# Patient Record
Sex: FEMALE | Race: WHITE | Employment: OTHER | URBAN - METROPOLITAN AREA
[De-identification: names, ages, dates, MRNs, and addresses within clinical notes are randomized per-mention and may not be internally consistent; named-entity substitution may affect disease eponyms.]

---

## 2017-01-23 ENCOUNTER — OFFICE VISIT (OUTPATIENT)
Dept: ORTHOPEDIC SURGERY | Facility: CLINIC | Age: 57
End: 2017-01-23

## 2017-01-23 VITALS
DIASTOLIC BLOOD PRESSURE: 77 MMHG | HEART RATE: 69 BPM | BODY MASS INDEX: 22.14 KG/M2 | HEIGHT: 66 IN | WEIGHT: 137.8 LBS | SYSTOLIC BLOOD PRESSURE: 112 MMHG | TEMPERATURE: 98.4 F

## 2017-01-23 DIAGNOSIS — S81.851A DOG BITE OF LOWER LEG, RIGHT, INITIAL ENCOUNTER: Primary | ICD-10-CM
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
## 2017-01-23 RX ORDER — LETROZOLE 2.5 MG/1
2.5 TABLET, FILM COATED ORAL DAILY
COMMUNITY
End: 2020-12-14

## 2017-01-23 RX ORDER — ACETAMINOPHEN 500 MG
TABLET ORAL
COMMUNITY

## 2017-01-23 RX ORDER — CHOLECALCIFEROL (VITAMIN D3) 125 MCG
CAPSULE ORAL
COMMUNITY
End: 2020-12-14

## 2017-01-23 NOTE — PATIENT INSTRUCTIONS
Animal Bites: Care Instructions  Your Care Instructions  After an animal bite, the biggest concern is infection. The chance of infection depends on the type of animal that bit you, where on your body you were bitten, and your general health. Many animal bites are not closed with stitches, because this can increase the chance of infection. Your bite may take as little as 7 days or as long as several months to heal, depending on how bad it is. Taking good care of your wound at home will help it heal and reduce your chance of infection. The doctor has checked you carefully, but problems can develop later. If you notice any problems or new symptoms, get medical treatment right away. Follow-up care is a key part of your treatment and safety. Be sure to make and go to all appointments, and call your doctor if you are having problems. It's also a good idea to know your test results and keep a list of the medicines you take. How can you care for yourself at home? · If your doctor told you how to care for your wound, follow your doctor's instructions. If you did not get instructions, follow this general advice:  ¨ After 24 to 48 hours, gently wash the wound with clean water 2 times a day. Do not scrub or soak the wound. Don't use hydrogen peroxide or alcohol, which can slow healing. ¨ You may cover the wound with a thin layer of petroleum jelly, such as Vaseline, and a nonstick bandage. ¨ Apply more petroleum jelly and replace the bandage as needed. · After you shower, gently dry the wound with a clean towel. · If your doctor has closed the wound, cover the bandage with a plastic bag before you take a shower. · A small amount of skin redness and swelling around the wound edges and the stitches or staples is normal. Your wound may itch or feel irritated. Do not scratch or rub the wound.   · Ask your doctor if you can take an over-the-counter pain medicine, such as acetaminophen (Tylenol), ibuprofen (Advil, Motrin), or naproxen (Aleve). Read and follow all instructions on the label. · Do not take two or more pain medicines at the same time unless the doctor told you to. Many pain medicines have acetaminophen, which is Tylenol. Too much acetaminophen (Tylenol) can be harmful. · If your bite puts you at risk for rabies, you will get a series of shots over the next few weeks to prevent rabies. Your doctor will tell you when to get the shots. It is very important that you get the full cycle of shots. Follow your doctor's instructions exactly. · You may need a tetanus shot if you have not received one in the last 5 years. · If your doctor prescribed antibiotics, take them as directed. Do not stop taking them just because you feel better. You need to take the full course of antibiotics. When should you call for help? Call your doctor now or seek immediate medical care if:  · The skin near the bite turns cold or pale or it changes color. · You lose feeling in the area near the bite, or it feels numb or tingly. · You have trouble moving a limb near the bite. · You have symptoms of infection, such as:  ¨ Increased pain, swelling, warmth, or redness near the wound. ¨ Red streaks leading from the wound. ¨ Pus draining from the wound. ¨ A fever. · Blood soaks through the bandage. Oozing small amounts of blood is normal.  · Your pain is getting worse. Watch closely for changes in your health, and be sure to contact your doctor if you are not getting better as expected. Where can you learn more? Go to http://lilly-artem.info/. Enter K094 in the search box to learn more about \"Animal Bites: Care Instructions. \"  Current as of: May 27, 2016  Content Version: 11.1  © 4409-9053 [employer]. Care instructions adapted under license by [employer] (which disclaims liability or warranty for this information).  If you have questions about a medical condition or this instruction, always ask your healthcare professional. [place] [address] any warranty or liability for your use of this information.

## 2017-01-23 NOTE — PROGRESS NOTES
Patient: Cheryl Kamara                MRN: [id number]       SSN: [id number]  YOB: 1960        AGE: 49 y.o. SEX: female    PCP: Mp Saleh MD  01/23/17    Chief Complaint   Patient presents with    Leg Pain     Right     HISTORY:  Cheryl Kamara is a 49 y.o. female who is seen for right leg pain. She reports that she was bitten by her friend's dog who is a long-haired Daschund on her right lower leg on 1/20/17 and landed on her right hip. She says that the dog was out in her friend's backyard when the incident occurred. The dog started nipping at her leg but eventually bit her calf and latched on. The dog's mouth had to be pried off her leg. She was prescribed antibiotics and given a tetanus shot at the ED. She states that she passed out from her right leg pain. She notes that she recommended that her friend put the dog down to ensure that no one else gets bitten in the future. She has some numbness and tingling in her right foot and toes currently. 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 She is  and does all of the [employer] store  work  Current weight is 137 pounds. She reports a h/o breast cancer, lymphedema and heart ablation. She was treated for her breast cancer with chemotherapy and lumpectomy. 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 She has two children. Her daughter lives in [place]. Her younger daughter is current in school at [employer]. She reports a h/o left arm lymphedema and neuropathy following her lumpectomy. She says that she exercises regularly at the gym. Weight Metrics 1/23/2017 4/22/2016 2/2/2015   Weight 137 lb 12.8 oz 136 lb 8 oz 130 lb   BMI 22.24 kg/m2 22.71 kg/m2 21.63 kg/m2     There is no problem list on file for this patient.     REVIEW OF SYSTEMS: All Below are Negative except: See HPI   Constitutional: negative for fever, chills, and weight loss. Cardiovascular: negative for chest pain, claudication, leg swelling, SOB, REINA   Gastrointestinal: Negative for pain, N/V/C/D, Blood in stool or urine, dysuria,  hematuria, incontinence, pelvic pain. Musculoskeletal: See HPI   Neurological: Negative for dizziness and weakness. Negative for headaches, Visual changes, confusion, seizures   Phychiatric/Behavioral: Negative for depression, memory loss, substance  abuse. Extremities: Negative for hair changes, rash, or skin lesion changes. Hematologic: Negative for bleeding problems, bruising, pallor or swollen lymph  nodes   Peripheral Vascular: No calf pain, no circulation deficits. Social History     Social History    Marital status:      Spouse name: N/A    Number of children: N/A    Years of education: N/A     Occupational History    Not on file. Social History Main Topics    Smoking status: Never Smoker    Smokeless tobacco: Never Used    Alcohol use Yes      Comment: daily wine    Drug use: No    Sexual activity: Not on file     Other Topics Concern    Not on file     Social History Narrative      Allergies   Allergen Reactions    Pcn [Penicillins] Anaphylaxis and Hives    Shellfish Derived Anaphylaxis and Hives      Current Outpatient Prescriptions   Medication Sig    letrozole (FEMARA) 2.5 mg tablet Take 2.5 mg by mouth daily.  naproxen sodium (ALEVE) 220 mg cap Take  by mouth.  acetaminophen (TYLENOL EXTRA STRENGTH) 500 mg tablet Take  by mouth every six (6) hours as needed for Pain.  nebivolol (BYSTOLIC) 5 mg tablet Take 5 mg by mouth daily.  ALPRAZolam (XANAX) 0.25 mg tablet Take 0.25 mg by mouth nightly as needed for Anxiety. Indications: ANXIETY    ondansetron (ZOFRAN ODT) 4 mg disintegrating tablet Take 4 mg by mouth every eight (8) hours as needed for Nausea.  anastrozole (ARIMIDEX) 1 mg tablet Take 1 mg by mouth daily.      No current facility-administered medications for this visit. PHYSICAL EXAMINATION:  Visit Vitals    /77    Pulse 69    Temp 98.4 °F (36.9 °C) (Oral)    Ht 5' 6\" (1.676 m)    Wt 137 lb 12.8 oz (62.5 kg)    BMI 22.24 kg/m2      ORTHO EXAMINATION:  Examination Right knee Left knee   Skin Intact Intact   Range of motion 120-0 120-0   Effusion - -   Medial joint line tenderness + +   Lateral joint line tenderness - -   Popliteal tenderness - -   Osteophytes palpable - -   Sylvesters - -   Patella crepitus - -   Anterior drawer - -   Lateral laxity - -   Medial laxity - -   Varus deformity - -   Valgus deformity - -   Pretibial edema - -   Calf tenderness - -     Examination Right Ankle/Foot   Skin Transverse laceration over distal medial lower right leg, nearly 100 puncture wounds (some deep)  Minimal serous drainage   Swelling -   Dorsiflexion 10   Plantarflexion 25   Deformity -   Inversion laxity -   Anterior drawer -   Medial tenderness -   Lateral tenderness -   Heel cord Intact   Sensation Intact   Bunion -   Toe nails Normal   Capillary refill Normal     IMPRESSION:      ICD-10-CM ICD-9-CM    1. Dog bite of lower leg, right, initial encounter S81.851A 891.0     W54. 0XXA E906.0     Calf     PLAN:  Her right calf dog-bite wound was cleaned and dressed today. She will clean her wound with alcohol apply antiseptic ointment as recommended. She will follow up in 9 days for possible suture removal.  There is no need for surgery at this time. She will complete her course of antibiotics. We discussed a possible course of right leg PT in the future.       Scribed by [employer] ([address]) as dictated by Dangelo Marino MD

## 2017-02-01 ENCOUNTER — OFFICE VISIT (OUTPATIENT)
Dept: ORTHOPEDIC SURGERY | Age: 57
End: 2017-02-01

## 2017-02-01 VITALS
TEMPERATURE: 97.6 F | SYSTOLIC BLOOD PRESSURE: 125 MMHG | WEIGHT: 137.2 LBS | BODY MASS INDEX: 22.05 KG/M2 | HEIGHT: 66 IN | HEART RATE: 62 BPM | DIASTOLIC BLOOD PRESSURE: 73 MMHG

## 2017-02-01 DIAGNOSIS — S81.851D DOG BITE OF LOWER LEG, RIGHT, SUBSEQUENT ENCOUNTER: Primary | ICD-10-CM
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
## 2017-02-01 NOTE — PATIENT INSTRUCTIONS
Animal Bites: Care Instructions  Your Care Instructions  After an animal bite, the biggest concern is infection. The chance of infection depends on the type of animal that bit you, where on your body you were bitten, and your general health. Many animal bites are not closed with stitches, because this can increase the chance of infection. Your bite may take as little as 7 days or as long as several months to heal, depending on how bad it is. Taking good care of your wound at home will help it heal and reduce your chance of infection. The doctor has checked you carefully, but problems can develop later. If you notice any problems or new symptoms, get medical treatment right away. Follow-up care is a key part of your treatment and safety. Be sure to make and go to all appointments, and call your doctor if you are having problems. It's also a good idea to know your test results and keep a list of the medicines you take. How can you care for yourself at home? · If your doctor told you how to care for your wound, follow your doctor's instructions. If you did not get instructions, follow this general advice:  ¨ After 24 to 48 hours, gently wash the wound with clean water 2 times a day. Do not scrub or soak the wound. Don't use hydrogen peroxide or alcohol, which can slow healing. ¨ You may cover the wound with a thin layer of petroleum jelly, such as Vaseline, and a nonstick bandage. ¨ Apply more petroleum jelly and replace the bandage as needed. · After you shower, gently dry the wound with a clean towel. · If your doctor has closed the wound, cover the bandage with a plastic bag before you take a shower. · A small amount of skin redness and swelling around the wound edges and the stitches or staples is normal. Your wound may itch or feel irritated. Do not scratch or rub the wound.   · Ask your doctor if you can take an over-the-counter pain medicine, such as acetaminophen (Tylenol), ibuprofen (Advil, Motrin), or naproxen (Aleve). Read and follow all instructions on the label. · Do not take two or more pain medicines at the same time unless the doctor told you to. Many pain medicines have acetaminophen, which is Tylenol. Too much acetaminophen (Tylenol) can be harmful. · If your bite puts you at risk for rabies, you will get a series of shots over the next few weeks to prevent rabies. Your doctor will tell you when to get the shots. It is very important that you get the full cycle of shots. Follow your doctor's instructions exactly. · You may need a tetanus shot if you have not received one in the last 5 years. · If your doctor prescribed antibiotics, take them as directed. Do not stop taking them just because you feel better. You need to take the full course of antibiotics. When should you call for help? Call your doctor now or seek immediate medical care if:  · The skin near the bite turns cold or pale or it changes color. · You lose feeling in the area near the bite, or it feels numb or tingly. · You have trouble moving a limb near the bite. · You have symptoms of infection, such as:  ¨ Increased pain, swelling, warmth, or redness near the wound. ¨ Red streaks leading from the wound. ¨ Pus draining from the wound. ¨ A fever. · Blood soaks through the bandage. Oozing small amounts of blood is normal.  · Your pain is getting worse. Watch closely for changes in your health, and be sure to contact your doctor if you are not getting better as expected. Where can you learn more? Go to http://lilly-artem.info/. Enter F067 in the search box to learn more about \"Animal Bites: Care Instructions. \"  Current as of: May 27, 2016  Content Version: 11.1  © 7865-4279 [employer]. Care instructions adapted under license by [employer] (which disclaims liability or warranty for this information).  If you have questions about a medical condition or this instruction, always ask your healthcare professional. [place] [address] any warranty or liability for your use of this information.

## 2017-02-01 NOTE — PROGRESS NOTES
Patient: Josr Greene                MRN: [id number]       SSN: [id number]  YOB: 1960        AGE: 49 y.o. SEX: female    PCP: Lazaro Gorman MD  02/01/17    Chief Complaint   Patient presents with    Leg Pain     Right     HISTORY:  Josr Greene is a 49 y.o. female who is seen for right leg pain. She reports that she was bitten by her friend's dog who is a long-haired Daschund on her right lower leg on 1/20/17 and landed on her right hip. She says that the dog was out in her friend's backyard when the incident occurred. The dog started nipping at her leg but eventually bit her calf and latched on. The dog's mouth had to be pried off her leg. She reports that the dog was put down after the incident and that she tested negative for rabies. She was prescribed antibiotics and given a tetanus shot at the ED. She states that she passed out from her right leg pain. She notes that she recommended that her friend put the dog down to ensure that no one else gets bitten in the future. She has some numbness and tingling in her right foot and toes currently. She is experiencing right calf soreness today but overall sheis doing much better. She has taken Aleve and tylenol for pain with benefit. 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 She is  and does all of the [employer] store  work  Current weight is 137 pounds. She reports a h/o breast cancer, lymphedema and heart ablation. She was treated for her breast cancer with chemotherapy and lumpectomy. 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 She has two children. Her daughter lives in [place]. Her younger daughter is current in school at [employer]. She reports a h/o left arm lymphedema and neuropathy following her lumpectomy. She says that she exercises regularly at the gym.       Weight Metrics 2/1/2017 1/23/2017 4/22/2016 2/2/2015   Weight 137 lb 3.2 oz 137 lb 12.8 oz 136 lb 8 oz 130 lb   BMI 22.14 kg/m2 22.24 kg/m2 22.71 kg/m2 21.63 kg/m2     There is no problem list on file for this patient. REVIEW OF SYSTEMS: All Below are Negative except: See HPI   Constitutional: negative for fever, chills, and weight loss. Cardiovascular: negative for chest pain, claudication, leg swelling, SOB, REINA   Gastrointestinal: Negative for pain, N/V/C/D, Blood in stool or urine, dysuria,  hematuria, incontinence, pelvic pain. Musculoskeletal: See HPI   Neurological: Negative for dizziness and weakness. Negative for headaches, Visual changes, confusion, seizures   Phychiatric/Behavioral: Negative for depression, memory loss, substance  abuse. Extremities: Negative for hair changes, rash, or skin lesion changes. Hematologic: Negative for bleeding problems, bruising, pallor or swollen lymph  nodes   Peripheral Vascular: No calf pain, no circulation deficits. Social History     Social History    Marital status:      Spouse name: N/A    Number of children: N/A    Years of education: N/A     Occupational History    Not on file. Social History Main Topics    Smoking status: Never Smoker    Smokeless tobacco: Never Used    Alcohol use Yes      Comment: daily wine    Drug use: No    Sexual activity: Not on file     Other Topics Concern    Not on file     Social History Narrative      Allergies   Allergen Reactions    Pcn [Penicillins] Anaphylaxis and Hives    Shellfish Derived Anaphylaxis and Hives      Current Outpatient Prescriptions   Medication Sig    letrozole (FEMARA) 2.5 mg tablet Take 2.5 mg by mouth daily.  acetaminophen (TYLENOL EXTRA STRENGTH) 500 mg tablet Take  by mouth every six (6) hours as needed for Pain.  ondansetron (ZOFRAN ODT) 4 mg disintegrating tablet Take 4 mg by mouth every eight (8) hours as needed for Nausea.     nebivolol (BYSTOLIC) 5 mg tablet Take 5 mg by mouth daily.  ALPRAZolam (XANAX) 0.25 mg tablet Take 0.25 mg by mouth nightly as needed for Anxiety. Indications: ANXIETY    naproxen sodium (ALEVE) 220 mg cap Take  by mouth.  anastrozole (ARIMIDEX) 1 mg tablet Take 1 mg by mouth daily. No current facility-administered medications for this visit. PHYSICAL EXAMINATION:  Visit Vitals    /73    Pulse 62    Temp 97.6 °F (36.4 °C) (Oral)    Ht 5' 6\" (1.676 m)    Wt 137 lb 3.2 oz (62.2 kg)    BMI 22.14 kg/m2      ORTHO EXAMINATION:  Examination Right Ankle/Foot   Skin Transverse laceration over distal medial lower right leg, nearly 100 puncture wounds (some deep)  Minimal serous drainage   Swelling -   Dorsiflexion 10   Plantarflexion 25   Deformity -   Inversion laxity -   Anterior drawer -   Medial tenderness -   Lateral tenderness -   Heel cord Intact   Sensation Intact   Bunion -   Toe nails Normal   Capillary refill Normal     IMPRESSION:      ICD-10-CM ICD-9-CM    1. Dog bite of lower leg, right, subsequent encounter S81.851D V58.89     W54. 0XXD 891.0      PLAN:  Her right calf dog-bite wound was cleaned, dressed, and her sutures were removed today. She will clean her wound with alcohol apply antiseptic ointment as recommended. She will follow up as needed. There is no need for surgery at this time. We discussed possible right calf I&D in the future. We discussed a possible course of right leg PT in the future depending on her progress.       Scribed by [employer] ([facility]) as dictated by Macrina Funes MD

## 2017-04-10 ENCOUNTER — HOSPITAL ENCOUNTER (OUTPATIENT)
Dept: GENERAL RADIOLOGY | Age: 57
Discharge: HOME | End: 2017-04-10
Attending: INTERNAL MEDICINE
Payer: COMMERCIAL

## 2017-04-10 ENCOUNTER — HOSPITAL ENCOUNTER (OUTPATIENT)
Dept: NUCLEAR MEDICINE | Age: 57
Discharge: HOME | End: 2017-04-10
Attending: INTERNAL MEDICINE
Payer: COMMERCIAL

## 2017-04-10 DIAGNOSIS — Z85.3 HISTORY OF BREAST CANCER: ICD-10-CM

## 2017-04-10 DIAGNOSIS — M16.11 ARTHROPATHY OF RIGHT HIP: ICD-10-CM

## 2017-04-10 PROCEDURE — 78306 BONE IMAGING WHOLE BODY: CPT

## 2017-04-10 PROCEDURE — 73502 X-RAY EXAM HIP UNI 2-3 VIEWS: CPT

## 2018-12-28 ENCOUNTER — HOSPITAL ENCOUNTER (OUTPATIENT)
Dept: BONE DENSITY | Age: 58
Discharge: HOME | End: 2018-12-28
Payer: COMMERCIAL

## 2018-12-28 DIAGNOSIS — Z78.0 POST-MENOPAUSAL: ICD-10-CM

## 2018-12-28 DIAGNOSIS — R23.2 FLUSHING: ICD-10-CM

## 2018-12-28 DIAGNOSIS — D64.9 ANEMIA, UNSPECIFIED: ICD-10-CM

## 2018-12-28 DIAGNOSIS — C50.912 MALIGNANT NEOPLASM OF LEFT BREAST (HCC): ICD-10-CM

## 2018-12-28 DIAGNOSIS — N95.1 FEMALE CLIMACTERIC STATE: ICD-10-CM

## 2018-12-28 PROCEDURE — 77080 DXA BONE DENSITY AXIAL: CPT

## 2020-08-07 DIAGNOSIS — F41.9 ANXIETY: Primary | ICD-10-CM

## 2020-08-07 RX ORDER — ALPRAZOLAM 0.25 MG/1
TABLET ORAL
Qty: 60 TAB | Refills: 3 | Status: SHIPPED | OUTPATIENT
Start: 2020-08-07 | End: 2020-12-14 | Stop reason: SDUPTHER

## 2020-08-07 NOTE — TELEPHONE ENCOUNTER
reviewed. I saw her in July. Med called in with refills. She will need to see me in three months to re-assess and consider dose de-ecalation.  was reviewed and Rx is appropriate

## 2020-10-29 ENCOUNTER — TRANSCRIBE ORDER (OUTPATIENT)
Dept: SCHEDULING | Age: 60
End: 2020-10-29

## 2020-10-29 DIAGNOSIS — N95.1 SYMPTOMATIC MENOPAUSAL OR FEMALE CLIMACTERIC STATES: Primary | ICD-10-CM

## 2020-12-14 ENCOUNTER — VIRTUAL VISIT (OUTPATIENT)
Dept: INTERNAL MEDICINE CLINIC | Age: 60
End: 2020-12-14
Payer: COMMERCIAL

## 2020-12-14 DIAGNOSIS — F41.9 ANXIETY: ICD-10-CM

## 2020-12-14 DIAGNOSIS — I10 ESSENTIAL HYPERTENSION: Primary | ICD-10-CM

## 2020-12-14 PROBLEM — E55.9 VITAMIN D DEFICIENCY: Status: ACTIVE | Noted: 2020-12-14

## 2020-12-14 PROCEDURE — 99442 PR PHYS/QHP TELEPHONE EVALUATION 11-20 MIN: CPT | Performed by: INTERNAL MEDICINE

## 2020-12-14 RX ORDER — ALPRAZOLAM 0.25 MG/1
0.25 TABLET ORAL DAILY
COMMUNITY
End: 2020-12-14 | Stop reason: SDUPTHER

## 2020-12-14 RX ORDER — NEBIVOLOL 5 MG/1
5 TABLET ORAL DAILY
COMMUNITY
End: 2020-12-14 | Stop reason: SDUPTHER

## 2020-12-14 RX ORDER — EXEMESTANE 25 MG/1
25 TABLET ORAL DAILY
COMMUNITY
Start: 2020-10-27

## 2020-12-14 RX ORDER — ACETAMINOPHEN 500 MG
2000 TABLET ORAL DAILY
COMMUNITY

## 2020-12-14 RX ORDER — ALPRAZOLAM 0.25 MG/1
0.25 TABLET ORAL
Qty: 60 TAB | Refills: 1 | Status: SHIPPED | OUTPATIENT
Start: 2020-12-14 | End: 2021-04-19

## 2020-12-14 RX ORDER — VENLAFAXINE 37.5 MG/1
37.5 TABLET ORAL DAILY
COMMUNITY
Start: 2020-10-27 | End: 2021-12-30 | Stop reason: DRUGHIGH

## 2020-12-14 NOTE — PROGRESS NOTES
I was at my office in [place], [place] while conducting this encounter. The patient is at home. Consent:  Patient and/or HIS/HER healthcare decision maker is aware that this patient-initiated Telehealth encounter is a billable service, with coverage as determined by patient's insurance carrier. Patient is aware that He/She may receive a bill and has provided verbal consent to proceed: Consent has been obtained within past 12 months of the date of this encounter. This virtual visit was conducted via Telephone    1. Anxiety  Mrs. Carlos A Rosen only takes this at most once every couple of days. I have reviewed the  and I see no abuse potential.  Will renew Xanax  - ALPRAZolam (XANAX) 0.25 mg tablet; Take 1 Tab by mouth nightly as needed for Anxiety. Max Daily Amount: 0.25 mg. Dispense: 60 Tab; Refill: 1    2. Essential hypertension  She reports blood pressure systolic readings in the low 1 teens. Continue Bystolic blood pressure stable       Chief Complaint   Patient presents with    Medication Refill        HPI   This is a delightful 73-year-old female with a history of anxiety and hypertension as well as breast cancer who presents for medication refill. The patient reports has been taking as needed Xanax once maybe every other 2 days for quite some time. Usually when she gets really stressed out or when she just keeps obsessing before sleep is when she takes it. She is never had any deleterious side effects. She denies any falls. She reports her blood pressure has been well controlled with a systolic reading of 547. Aside from her hot flashes she feels great and has not had any recent illnesses. Current Outpatient Medications on File Prior to Visit   Medication Sig Dispense Refill    venlafaxine (EFFEXOR) 37.5 mg tablet Take 37.5 mg by mouth daily. 1 pill in morning and 2 pills at night for hot flashes      exemestane (AROMASIN) 25 mg tablet Take 25 mg by mouth daily.       cholecalciferol (VITAMIN D3) (2,000 UNITS /50 MCG) cap capsule Take 2,000 Units by mouth daily.  acetaminophen (TYLENOL EXTRA STRENGTH) 500 mg tablet Take  by mouth every six (6) hours as needed for Pain.  nebivolol (BYSTOLIC) 5 mg tablet Take 5 mg by mouth daily. No current facility-administered medications on file prior to visit.          Patient Active Problem List   Diagnosis Code    Vitamin D deficiency E55.9             Total Time Spent on this Encounter:12 minutes spent

## 2020-12-14 NOTE — PROGRESS NOTES
Promise Toussaint presents today for   Chief Complaint   Patient presents with    Medication Refill       Depression Screening:  3 most recent PHQ Screens 12/14/2020   Little interest or pleasure in doing things Not at all   Feeling down, depressed, irritable, or hopeless Not at all   Total Score PHQ 2 0       Learning Assessment:  Learning Assessment 12/14/2020   PRIMARY LEARNER Patient   HIGHEST LEVEL OF EDUCATION - PRIMARY LEARNER  2 YEARS OF COLLEGE   BARRIERS PRIMARY LEARNER NONE   PRIMARY LANGUAGE ENGLISH   LEARNER PREFERENCE PRIMARY READING   ANSWERED BY patient   RELATIONSHIP SELF       Health Maintenance reviewed and discussed and ordered per Provider. Health Maintenance Due   Topic Date Due    Hepatitis C Screening  1960    PAP AKA CERVICAL CYTOLOGY  09/09/1983    Lipid Screen  09/09/2002    Shingrix Vaccine Age 50> (1 of 2) 09/09/2012    Colorectal Cancer Screening Combo  09/09/2012    Breast Cancer Screen Mammogram  09/09/2012    Flu Vaccine (1) 09/01/2020   . Coordination of Care:  1. Have you been to the ER, urgent care clinic since your last visit? Hospitalized since your last visit? no    2. Have you seen or consulted any other health care providers outside of the [address] [place] [address] since your last visit? Include any pap smears or colon screening.  no

## 2021-01-07 RX ORDER — NEBIVOLOL HYDROCHLORIDE 5 MG/1
TABLET ORAL
Qty: 90 TAB | Refills: 1 | Status: SHIPPED | OUTPATIENT
Start: 2021-01-07 | End: 2021-07-02

## 2021-01-27 ENCOUNTER — HOSPITAL ENCOUNTER (OUTPATIENT)
Dept: BONE DENSITY | Age: 61
Discharge: HOME | End: 2021-01-27
Attending: INTERNAL MEDICINE
Payer: COMMERCIAL

## 2021-01-27 DIAGNOSIS — N95.1 SYMPTOMATIC MENOPAUSAL OR FEMALE CLIMACTERIC STATES: ICD-10-CM

## 2021-01-27 PROCEDURE — 77080 DXA BONE DENSITY AXIAL: CPT

## 2021-04-18 DIAGNOSIS — F41.9 ANXIETY: ICD-10-CM

## 2021-04-19 RX ORDER — ALPRAZOLAM 0.25 MG/1
TABLET ORAL
Qty: 30 TAB | Refills: 3 | Status: SHIPPED | OUTPATIENT
Start: 2021-04-19 | End: 2021-12-30 | Stop reason: CLARIF

## 2021-07-02 RX ORDER — NEBIVOLOL HYDROCHLORIDE 5 MG/1
TABLET ORAL
Qty: 90 TABLET | Refills: 1 | Status: SHIPPED | OUTPATIENT
Start: 2021-07-02 | End: 2021-12-22

## 2021-07-19 LAB — COLONOSCOPY, EXTERNAL: NORMAL

## 2021-12-22 RX ORDER — NEBIVOLOL HYDROCHLORIDE 5 MG/1
TABLET ORAL
Qty: 90 TABLET | Refills: 1 | Status: SHIPPED | OUTPATIENT
Start: 2021-12-22 | End: 2022-07-13

## 2021-12-30 ENCOUNTER — VIRTUAL VISIT (OUTPATIENT)
Dept: INTERNAL MEDICINE CLINIC | Age: 61
End: 2021-12-30
Payer: COMMERCIAL

## 2021-12-30 DIAGNOSIS — F41.9 ANXIETY AND DEPRESSION: Primary | ICD-10-CM

## 2021-12-30 DIAGNOSIS — I10 ESSENTIAL HYPERTENSION: ICD-10-CM

## 2021-12-30 DIAGNOSIS — F32.A ANXIETY AND DEPRESSION: Primary | ICD-10-CM

## 2021-12-30 PROCEDURE — 99443 PR PHYS/QHP TELEPHONE EVALUATION 21-30 MIN: CPT | Performed by: INTERNAL MEDICINE

## 2021-12-30 RX ORDER — ALPRAZOLAM 0.25 MG/1
0.25 TABLET ORAL
Qty: 30 TABLET | Refills: 2 | Status: SHIPPED | OUTPATIENT
Start: 2021-12-30 | End: 2022-04-22 | Stop reason: SDUPTHER

## 2021-12-30 RX ORDER — VENLAFAXINE 75 MG/1
75 TABLET ORAL 2 TIMES DAILY
COMMUNITY

## 2021-12-30 NOTE — PROGRESS NOTES
Patient needs refill on her xanax. Anson Argueta presents today for   Chief Complaint   Patient presents with    Anxiety     follow up    Medication Refill       Depression Screening:  3 most recent PHQ Screens 12/30/2021   Little interest or pleasure in doing things Not at all   Feeling down, depressed, irritable, or hopeless Not at all   Total Score PHQ 2 0       Learning Assessment:  Learning Assessment 12/14/2020   PRIMARY LEARNER Patient   HIGHEST LEVEL OF EDUCATION - PRIMARY LEARNER  2 YEARS OF COLLEGE   BARRIERS PRIMARY LEARNER NONE   PRIMARY LANGUAGE ENGLISH   LEARNER PREFERENCE PRIMARY READING   ANSWERED BY patient   RELATIONSHIP SELF       Health Maintenance reviewed and discussed and ordered per Provider. Health Maintenance Due   Topic Date Due    Hepatitis C Screening  Never done    COVID-19 Vaccine (1) Never done    Lipid Screen  Never done    Shingrix Vaccine Age 50> (1 of 2) Never done    Breast Cancer Screen Mammogram  Never done    Colorectal Cancer Screening Combo  04/22/2021   . Coordination of Care:  1. \"Have you been to the ER, urgent care clinic since your last visit? Hospitalized since your last visit? \" No    2. \"Have you seen or consulted any other health care providers outside of the [address] [place] [address] since your last visit? \" No     3. For patients aged 39-70: Has the patient had a colonoscopy? Yes, HM satisfied with blue hyperlink     If the patient is female:    4. For patients aged 41-77: Has the patient had a mammogram within the past 2 years? Yes, HM satisfied with blue hyperlink    5. For patients aged 21-65: Has the patient had a pap smear?  No

## 2021-12-30 NOTE — PROGRESS NOTES
I was at my office in [place], [place] while conducting this encounter. The patient is at home. Consent:  Patient and/or HIS/HER healthcare decision maker is aware that this patient-initiated Telehealth encounter is a billable service, with coverage as determined by patient's insurance carrier. Patient is aware that He/She may receive a bill and has provided verbal consent to proceed: Consent has been obtained within past 12 months of the date of this encounter. This virtual visit was conducted via Telephone    1. Anxiety and depression  The patient has been off of her Xanax for about 2 months and events in her life over the past month been extremely stressful. Even over Christmas she was unable to see her daughter because her daughter had to be quarantined due to Covid. I believe a small amount of Xanax would be appropriate in this particular patient. I have reviewed her prescribing history and there is no evidence of any sort of abuse. At this time I will give her alprazolam with 2 refills. Based on her history this will last over a year  - ALPRAZolam (XANAX) 0.25 mg tablet; Take 1 Tablet by mouth nightly as needed for Anxiety. Max Daily Amount: 0.25 mg. Dispense: 30 Tablet; Refill: 2    2. Essential hypertension  She reports her blood pressures have been well controlled. I have reviewed labs from [facility] and this appears to be the case. This note I have reviewed all records from [facility]  Chief Complaint   Patient presents with    Anxiety     follow up    Medication Refill        HPI   This is a delightful 46-year-old female breast cancer survivor of 6 years. She has a history of hypertension and hot flashes. She presents today due to increased anxiety over the holidays. She has been going through a lot. Her daughter as a matter fact was diagnosed with Covid and she was unable to see her daughter over the holidays for the first time. She had previously been on alprazolam on a daily basis. After extensive discussions with the patient she decided to stop taking it. She reported no significant withdrawal symptoms. Over the past few weeks though it is really gotten difficult and she is having a hard time sleeping. She is unable to take melatonin and Benadryl. The patient reports that previously a very small dose of Xanax did help her sleep. She has no complaints today she continues to take her Bystolic and follows up closely with her oncologist.  I have reviewed labs from [facility] from October 26, 2021 as well as April 27, 2021. Aside from a slightly depressed white cell count her labs have been essentially normal.  I have also reviewed Dr. Libertad Barragan hematology oncology notes from August 13, 2021 and again on 10/26/2021  Current Outpatient Medications on File Prior to Visit   Medication Sig Dispense Refill    venlafaxine (EFFEXOR) 75 mg tablet Take 75 mg by mouth two (2) times a day.  Bystolic 5 mg tablet take 1 tablet by mouth once daily 90 Tablet 1    exemestane (AROMASIN) 25 mg tablet Take 25 mg by mouth daily.  cholecalciferol (VITAMIN D3) (2,000 UNITS /50 MCG) cap capsule Take 2,000 Units by mouth daily.  acetaminophen (TYLENOL EXTRA STRENGTH) 500 mg tablet Take  by mouth every six (6) hours as needed for Pain. No current facility-administered medications on file prior to visit. Patient Active Problem List   Diagnosis Code    Vitamin D deficiency E55.9             Total Time Spent on this Encounter:  Total time spent including chart review was approximately 21 minutes

## 2022-03-18 PROBLEM — E55.9 VITAMIN D DEFICIENCY: Status: ACTIVE | Noted: 2020-12-14

## 2022-04-22 ENCOUNTER — OFFICE VISIT (OUTPATIENT)
Dept: FAMILY MEDICINE CLINIC | Age: 62
End: 2022-04-22
Payer: COMMERCIAL

## 2022-04-22 VITALS
HEART RATE: 73 BPM | WEIGHT: 138 LBS | RESPIRATION RATE: 16 BRPM | SYSTOLIC BLOOD PRESSURE: 125 MMHG | TEMPERATURE: 97.8 F | OXYGEN SATURATION: 97 % | BODY MASS INDEX: 22.27 KG/M2 | DIASTOLIC BLOOD PRESSURE: 84 MMHG

## 2022-04-22 DIAGNOSIS — Z11.59 NEED FOR HEPATITIS C SCREENING TEST: Primary | ICD-10-CM

## 2022-04-22 DIAGNOSIS — F41.9 ANXIETY AND DEPRESSION: ICD-10-CM

## 2022-04-22 DIAGNOSIS — I10 PRIMARY HYPERTENSION: ICD-10-CM

## 2022-04-22 DIAGNOSIS — Z11.59 NEED FOR HEPATITIS C SCREENING TEST: ICD-10-CM

## 2022-04-22 DIAGNOSIS — F32.A ANXIETY AND DEPRESSION: ICD-10-CM

## 2022-04-22 PROCEDURE — 99213 OFFICE O/P EST LOW 20 MIN: CPT | Performed by: NURSE PRACTITIONER

## 2022-04-22 RX ORDER — ALPRAZOLAM 0.25 MG/1
0.25 TABLET ORAL
Qty: 30 TABLET | Refills: 2 | Status: SHIPPED | OUTPATIENT
Start: 2022-04-22 | End: 2022-08-03

## 2022-04-22 RX ORDER — BUSPIRONE HYDROCHLORIDE 5 MG/1
5 TABLET ORAL DAILY
Qty: 30 TABLET | Refills: 1 | Status: SHIPPED | OUTPATIENT
Start: 2022-04-22 | End: 2022-06-22

## 2022-04-22 NOTE — PROGRESS NOTES
History of Present Illness  Huong Cardenas is a 63 y.o. female who presents today to establish care with provider. She is followed by [facility] oncology. She is a  breast cancer survivor and is closely followed by oncology has an appointment next week. She has no complaints today she continues to take her Bystolic and follows up closely with her oncologist.  Her last PCP visit was approximately 5 months ago she was prescribed Xanax due to increased anxiety from the holidays and her daughter having COVID. 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 She reports the Xanax does help her sleep reports she is unable to take melatonin and Benadryl. Chief Complaint   Patient presents with   [facility] Establish Care     establish care with provider            Past Medical History:   Diagnosis Date    Arrhythmia 1992    [facility]    Cancer [facility])     breast cancer-left    Hypertension     Nausea & vomiting     Vitamin D deficiency 12/14/2020        Past Surgical History:   Procedure Laterality Date    COLONOSCOPY N/A 4/22/2016    COLONOSCOPY performed by Miley Sales MD at [address] HX [facility] HX HYSTERECTOMY      HX VASCULAR ACCESS  2/2015    mediport placement for chemo    HX WISDOM TEETH EXTRACTION      SC BREAST SURGERY PROCEDURE UNLISTED Left 7/2015    left lumpectomy and lymph nodes remove    SC BREAST SURGERY PROCEDURE UNLISTED  2015    augmentation        Current Medications  Current Outpatient Medications   Medication Sig    venlafaxine (EFFEXOR) 75 mg tablet Take 75 mg by mouth two (2) times a day.  ALPRAZolam (XANAX) 0.25 mg tablet Take 1 Tablet by mouth nightly as needed for Anxiety. Max Daily Amount: 1.18 mg.    Bystolic 5 mg tablet take 1 tablet by mouth once daily    exemestane (AROMASIN) 25 mg tablet Take 25 mg by mouth daily.  cholecalciferol (VITAMIN D3) (2,000 UNITS /50 MCG) cap capsule Take 2,000 Units by mouth daily.     acetaminophen (TYLENOL EXTRA STRENGTH) 500 mg tablet Take  by mouth every six (6) hours as needed for Pain. No current facility-administered medications for this visit. Allergies   Allergen Reactions    Pcn [Penicillins] Anaphylaxis and Hives    Shellfish Derived Anaphylaxis and Hives     Patient states she is no longer allergic after Chemo           History reviewed. No pertinent family history. Social History     Tobacco Use    Smoking status: Never Smoker    Smokeless tobacco: Never Used   Substance Use Topics    Alcohol use: Yes     Comment: daily wine    Drug use: No        Health Maintenance   Topic Date Due    Hepatitis C Screening  Never done    Lipid Screen  Never done    Shingrix Vaccine Age 50> (1 of 2) Never done    Colorectal Cancer Screening Combo  04/22/2021    Depression Screen  12/30/2022    Breast Cancer Screen Mammogram  04/14/2024    DTaP/Tdap/Td series (2 - Td or Tdap) 01/20/2027    Bone Densitometry (Dexa) Screening  09/09/2027    Flu Vaccine  Completed    COVID-19 Vaccine  Completed    Pneumococcal 0-64 years  Aged [employer] History   Administered Date(s) Administered    COVID-19, Moderna Booster, PF, 0.25mL Dose 11/03/2021    COVID-19, Amrik Lucks, Primary or Immunocompromised Series, MRNA, PF, 100mcg/0.5mL 02/02/2021, 03/02/2021    Influenza Vaccine 10/30/2021       Review of Systems  Review of Systems   All other systems reviewed and are negative. Physical Exam  Vitals reviewed. Constitutional:       Appearance: Normal appearance. Cardiovascular:      Rate and Rhythm: Normal rate and regular rhythm. Pulmonary:      Effort: Pulmonary effort is normal.      Breath sounds: Normal breath sounds. Abdominal:      General: Bowel sounds are normal.      Palpations: Abdomen is soft. Skin:     General: Skin is warm. Neurological:      Mental Status: She is alert and oriented to person, place, and time.             Visit Vitals  BP 125/84   Pulse 73   Temp 97.8 °F (36.6 °C)   Resp 16   Wt 138 lb (62.6 kg)   LMP 01/19/2002   SpO2 97%   BMI 22.27 kg/m²          Laboratory/Tests:  No visits with results within 3 Month(s) from this visit. Latest known visit with results is:   Admission on 02/02/2015, Discharged on 02/02/2015   Component Date Value Ref Range Status    Sodium 02/02/2015 139  136 - 145 mmol/L Final    Potassium 02/02/2015 4.1  3.5 - 5.5 mmol/L Final    Chloride 02/02/2015 106  100 - 108 mmol/L Final    CO2 02/02/2015 29  21 - 32 mmol/L Final    Anion gap 02/02/2015 4  3.0 - 18 mmol/L Final    Glucose 02/02/2015 78  74 - 99 mg/dL Final    BUN 02/02/2015 17  7.0 - 18 MG/DL Final    Creatinine 02/02/2015 0.80  0.6 - 1.3 MG/DL Final    BUN/Creatinine ratio 02/02/2015 21* 12 - 20   Final    GFR est AA 02/02/2015 >60  >60 ml/min/1.73m2 Final    GFR est non-AA 02/02/2015 >60  >60 ml/min/1.73m2 Final    Comment: (NOTE)                           Estimated GFR is calculated using the Modification of Diet in Renal                            Disease (MDRD) Study equation, reported for both  Americans                            (GFRAA) and non- Americans (GFRNA), and normalized to 1.73m2                            body surface area. The physician must decide which value applies to                            the patient. The MDRD study equation should only be used in                            individuals age 25 or older. It has not been validated for the                            following: pregnant women, patients with serious comorbid conditions,                            or on certain medications, or persons with extremes of body size,                            muscle mass, or nutritional status.     Calcium 02/02/2015 9.0  8.5 - 10.1 MG/DL Final    WBC 02/02/2015 5.6  4.6 - 13.2 K/uL Final    RBC 02/02/2015 4.35  4.20 - 5.30 M/uL Final    HGB 02/02/2015 13.6  12.0 - 16.0 g/dL Final    HCT 02/02/2015 40.7 35.0 - 45.0 % Final    MCV 02/02/2015 93.6  74.0 - 97.0 FL Final    MCH 02/02/2015 31.3  24.0 - 34.0 PG Final    MCHC 02/02/2015 33.4  31.0 - 37.0 g/dL Final    RDW 02/02/2015 12.0  11.6 - 14.5 % Final    PLATELET 34/72/9512 533  135 - 420 K/uL Final    MPV 02/02/2015 10.9  9.2 - 11.8 FL Final    Prothrombin time 02/02/2015 11.7  11.5 - 15.2 sec Final    INR 02/02/2015 0.8  0.8 - 1.2   Final    Comment:            INR Therapeutic Ranges                                  (on stable oral anticoagulant):                              INDICATION                INR                           DVT/PE/Atrial Fib          2.0-3.0                           MI/Mechanical Heart Valve  2.5-3.5    aPTT 02/02/2015 26.9  24.6 - 37.7 SEC Final         Assessment/Plan:    1. Need for hepatitis C screening test  - HEPATITIS C AB; Future    2. Primary hypertension  Blood pressure stable today continue Bystolic, labs from 59/21/178 reviewed within normal limits  3. Anxiety and depression  Discussed her anxiety and use of Xanax due to the addictive side effects decided we will try BuSpar 5 mg daily for her anxiety. Instructed it does take a couple weeks for the medication to be effective we will go ahead and refill Xanax also for now if needed for sleep. She will let me know if BuSpar is not effective for her. - ALPRAZolam (XANAX) 0.25 mg tablet; Take 1 Tablet by mouth nightly as needed for Anxiety. Max Daily Amount: 0.25 mg. Dispense: 30 Tablet; Refill: 2  - busPIRone (BUSPAR) 5 mg tablet; Take 1 Tablet by mouth daily. Dispense: 30 Tablet; Refill: 1     Mammograms up-to-date      I have discussed the diagnosis with the patient and the intended plan as seen in the above orders. The patient has received an after-visit summary and questions were answered concerning future plans. I have discussed medication side effects and warnings with the patient as well.  I have reviewed the plan of care with the patient, accepted their input and they are in agreement with the treatment goals. Previous lab and imaging results were reviewed by me.        [address] [facility], NP-YOUSIF  April 22, 2022

## 2022-04-22 NOTE — PROGRESS NOTES
Doneen Homans presents today for   Chief Complaint   Patient presents with   [facility] Establish Care     establish care with provider        Is someone accompanying this pt? No     Is the patient using any DME equipment during OV? No     Depression Screening:  3 most recent PHQ Screens 4/22/2022   Little interest or pleasure in doing things Not at all   Feeling down, depressed, irritable, or hopeless Not at all   Total Score PHQ 2 0       Learning Assessment:  Learning Assessment 12/14/2020   PRIMARY LEARNER Patient   HIGHEST LEVEL OF EDUCATION - PRIMARY LEARNER  2 YEARS OF COLLEGE   BARRIERS PRIMARY LEARNER NONE   PRIMARY LANGUAGE ENGLISH   LEARNER PREFERENCE PRIMARY READING   ANSWERED BY patient   RELATIONSHIP SELF       Fall Risk  Fall Risk Assessment, last 12 mths 12/14/2020   Able to walk? Yes   Fall in past 12 months? No       ADL  ADL Assessment 12/30/2021   Feeding yourself No Help Needed   Getting from bed to chair No Help Needed   Getting dressed No Help Needed   Bathing or showering No Help Needed   Walk across the room (includes cane/walker) No Help Needed   Using the telphone No Help Needed   Taking your medications No Help Needed   Preparing meals No Help Needed   Managing money (expenses/bills) No Help Needed   Moderately strenuous housework (laundry) No Help Needed   Shopping for personal items (toiletries/medicines) No Help Needed   Shopping for groceries No Help Needed   Driving No Help Needed   Climbing a flight of stairs No Help Needed   Getting to places beyond walking distances No Help Needed       Travel Screening:    Travel Screening     Question   Response    In the last 10 days, have you been in contact with someone who was confirmed or suspected to have Coronavirus/COVID-19? No / Unsure    Have you had a COVID-19 viral test in the last 10 days? No    Do you have any of the following new or worsening symptoms?   None of these    Have you traveled internationally or domestically in the last month? No      Travel History   Travel since 03/22/22    No documented travel since 03/22/22         Health Maintenance reviewed and discussed and ordered per Provider. Health Maintenance Due   Topic Date Due    Hepatitis C Screening  Never done    Lipid Screen  Never done    Shingrix Vaccine Age 50> (1 of 2) Never done    Colorectal Cancer Screening Combo  04/22/2021   . Coordination of Care:  1. \"Have you been to the ER, urgent care clinic since your last visit? Hospitalized since your last visit? \" No    2. \"Have you seen or consulted any other health care providers outside of the [address] [place] [address] since your last visit? \" No     3. For patients aged 39-70: Has the patient had a colonoscopy? Yes - Care Gap present. Rooming MA/LPN to request most recent results at Oncology office     If the patient is female:    4. For patients aged 41-77: Has the patient had a mammogram within the past 2 years? Yes - no Care Gap present    5. For patients aged 21-65: Has the patient had a pap smear?  Yes - no Care Gap present

## 2022-05-02 ENCOUNTER — HOSPITAL ENCOUNTER (OUTPATIENT)
Dept: LAB | Age: 62
Discharge: HOME | End: 2022-05-02
Payer: COMMERCIAL

## 2022-05-02 LAB
CHOLEST SERPL-MCNC: 183 MG/DL
HDLC SERPL-MCNC: 57 MG/DL (ref 40–60)
HDLC SERPL: 3.2 {RATIO} (ref 0–5)
LDLC SERPL CALC-MCNC: 92.2 MG/DL (ref 0–100)
LIPID PROFILE,FLP: ABNORMAL
TRIGL SERPL-MCNC: 169 MG/DL (ref ?–150)
VLDLC SERPL CALC-MCNC: 33.8 MG/DL

## 2022-05-02 PROCEDURE — 80061 LIPID PANEL: CPT

## 2022-05-02 PROCEDURE — 36415 COLL VENOUS BLD VENIPUNCTURE: CPT

## 2022-05-02 PROCEDURE — 86803 HEPATITIS C AB TEST: CPT

## 2022-05-03 LAB
HCV AB SER IA-ACNC: 0.06 INDEX
HCV AB SERPL QL IA: NEGATIVE
HCV COMMENT,HCGAC: NORMAL

## 2022-06-22 DIAGNOSIS — F41.9 ANXIETY AND DEPRESSION: ICD-10-CM

## 2022-06-22 DIAGNOSIS — F32.A ANXIETY AND DEPRESSION: ICD-10-CM

## 2022-06-22 RX ORDER — BUSPIRONE HYDROCHLORIDE 5 MG/1
TABLET ORAL
Qty: 90 TABLET | Refills: 1 | Status: SHIPPED | OUTPATIENT
Start: 2022-06-22

## 2022-07-13 RX ORDER — NEBIVOLOL 5 MG/1
TABLET ORAL
Qty: 90 TABLET | Refills: 1 | Status: SHIPPED | OUTPATIENT
Start: 2022-07-13

## 2022-07-13 NOTE — TELEPHONE ENCOUNTER
Requested Prescriptions     Pending Prescriptions Disp Refills    nebivoloL (BYSTOLIC) 5 mg tablet [Pharmacy Med Name: NEBIVOLOL 5 MG TABLET] 90 Tablet 1     Sig: take 1 tablet by mouth once daily       3 months supply to [facility]

## 2022-08-03 DIAGNOSIS — F41.9 ANXIETY AND DEPRESSION: ICD-10-CM

## 2022-08-03 DIAGNOSIS — F32.A ANXIETY AND DEPRESSION: ICD-10-CM

## 2022-08-03 RX ORDER — ALPRAZOLAM 0.25 MG/1
TABLET ORAL
Qty: 30 TABLET | Refills: 1 | Status: SHIPPED | OUTPATIENT
Start: 2022-08-03

## 2022-11-03 ENCOUNTER — TRANSCRIBE ORDER (OUTPATIENT)
Dept: SCHEDULING | Age: 62
End: 2022-11-03

## 2022-11-03 DIAGNOSIS — M81.0 SENILE OSTEOPOROSIS: Primary | ICD-10-CM

## 2023-01-28 ENCOUNTER — TRANSCRIBE ORDERS (OUTPATIENT)
Facility: HOSPITAL | Age: 63
End: 2023-01-28

## 2023-01-28 DIAGNOSIS — M81.0 SENILE OSTEOPOROSIS: Primary | ICD-10-CM

## 2023-01-31 DIAGNOSIS — M81.0 SENILE OSTEOPOROSIS: Primary | ICD-10-CM

## 2023-02-05 DIAGNOSIS — M81.0 SENILE OSTEOPOROSIS: Primary | ICD-10-CM

## 2023-03-24 ENCOUNTER — OFFICE VISIT (OUTPATIENT)
Facility: CLINIC | Age: 63
End: 2023-03-24

## 2023-03-24 VITALS
SYSTOLIC BLOOD PRESSURE: 122 MMHG | DIASTOLIC BLOOD PRESSURE: 81 MMHG | OXYGEN SATURATION: 96 % | RESPIRATION RATE: 18 BRPM | WEIGHT: 141.4 LBS | HEART RATE: 74 BPM | BODY MASS INDEX: 22.73 KG/M2 | HEIGHT: 66 IN | TEMPERATURE: 97.2 F

## 2023-03-24 DIAGNOSIS — L40.9 PSORIASIS: ICD-10-CM

## 2023-03-24 DIAGNOSIS — I89.0 LYMPHEDEMA: ICD-10-CM

## 2023-03-24 DIAGNOSIS — I10 PRIMARY HYPERTENSION: ICD-10-CM

## 2023-03-24 DIAGNOSIS — C50.912 CARCINOMA OF LEFT FEMALE BREAST, UNSPECIFIED ESTROGEN RECEPTOR STATUS, UNSPECIFIED SITE OF BREAST (HCC): ICD-10-CM

## 2023-03-24 DIAGNOSIS — Z00.00 HEALTHCARE MAINTENANCE: Primary | ICD-10-CM

## 2023-03-24 DIAGNOSIS — F41.9 ANXIETY: ICD-10-CM

## 2023-03-24 DIAGNOSIS — F51.01 PRIMARY INSOMNIA: ICD-10-CM

## 2023-03-24 PROBLEM — F32.0 MILD SINGLE CURRENT EPISODE OF MAJOR DEPRESSIVE DISORDER (HCC): Status: RESOLVED | Noted: 2018-09-07 | Resolved: 2023-03-24

## 2023-03-24 PROBLEM — F32.0 MILD SINGLE CURRENT EPISODE OF MAJOR DEPRESSIVE DISORDER (HCC): Status: ACTIVE | Noted: 2018-09-07

## 2023-03-24 PROBLEM — E55.9 VITAMIN D DEFICIENCY: Status: ACTIVE | Noted: 2020-12-14

## 2023-03-24 RX ORDER — CLOBETASOL PROPIONATE 0.05 G/100ML
SHAMPOO TOPICAL
Qty: 118 ML | Refills: 0 | Status: SHIPPED | OUTPATIENT
Start: 2023-03-24

## 2023-03-24 RX ORDER — FAMOTIDINE 20 MG/1
20 TABLET, FILM COATED ORAL DAILY
COMMUNITY

## 2023-03-24 RX ORDER — DOXEPIN HYDROCHLORIDE 10 MG/1
10 CAPSULE ORAL NIGHTLY
Qty: 30 CAPSULE | Refills: 3 | Status: SHIPPED | OUTPATIENT
Start: 2023-03-24

## 2023-03-24 RX ORDER — MULTIVIT,IRON,MINERALS/LUTEIN
TABLET ORAL
COMMUNITY

## 2023-03-24 SDOH — ECONOMIC STABILITY: FOOD INSECURITY: WITHIN THE PAST 12 MONTHS, THE FOOD YOU BOUGHT JUST DIDN'T LAST AND YOU DIDN'T HAVE MONEY TO GET MORE.: NEVER TRUE

## 2023-03-24 SDOH — ECONOMIC STABILITY: HOUSING INSECURITY
IN THE LAST 12 MONTHS, WAS THERE A TIME WHEN YOU DID NOT HAVE A STEADY PLACE TO SLEEP OR SLEPT IN A SHELTER (INCLUDING NOW)?: NO

## 2023-03-24 SDOH — ECONOMIC STABILITY: FOOD INSECURITY: WITHIN THE PAST 12 MONTHS, YOU WORRIED THAT YOUR FOOD WOULD RUN OUT BEFORE YOU GOT MONEY TO BUY MORE.: NEVER TRUE

## 2023-03-24 SDOH — ECONOMIC STABILITY: INCOME INSECURITY: HOW HARD IS IT FOR YOU TO PAY FOR THE VERY BASICS LIKE FOOD, HOUSING, MEDICAL CARE, AND HEATING?: NOT HARD AT ALL

## 2023-03-24 ASSESSMENT — PATIENT HEALTH QUESTIONNAIRE - PHQ9
SUM OF ALL RESPONSES TO PHQ QUESTIONS 1-9: 0
SUM OF ALL RESPONSES TO PHQ9 QUESTIONS 1 & 2: 0
1. LITTLE INTEREST OR PLEASURE IN DOING THINGS: 0
2. FEELING DOWN, DEPRESSED OR HOPELESS: 0
SUM OF ALL RESPONSES TO PHQ QUESTIONS 1-9: 0

## 2023-03-24 NOTE — PROGRESS NOTES
Marcia Pichardo presents today for   Chief Complaint   Patient presents with    New Patient     Here to establish care - former [facility] patient       Is someone accompanying this pt? no    Is the patient using any DME equipment during OV? no    Health Maintenance reviewed and discussed and ordered per Provider. Health Maintenance Due   Topic Date Due    HIV screen  Never done    Colorectal Cancer Screen  04/22/2021    COVID-19 Vaccine (4 - Booster for Agatha Leventhal series) 12/29/2021    Depression Monitoring  04/22/2023   . Coordination of Care:  1. \"Have you been to the ER, urgent care clinic since your last visit? Hospitalized since your last visit? \" No    2. \"Have you seen or consulted any other health care providers outside of the [address] [place] [address] since your last visit? \" No    3. For patients aged 39-70: Has the patient had a colonoscopy? Yes- Rooming LPN/MA will get records    If the patient is female:    4. For patients aged 41-77: Has the patient had a mammogram within the past 2 years? Yes- No care gap present    5. For patients aged 21-65: Has the patient had a pap smear?  N/A based on age/sex

## 2023-03-24 NOTE — PROGRESS NOTES
Chronic Disease Follow up    [address] (: 1960) is a 63 y.o. female here for evaluation of the following chief concerns(s):  New Patient (Here to establish care - former [facility] patient)       ASSESSMENT/PLAN:  1. Healthcare maintenance  -     Lipid Panel; Future  -     Comprehensive Metabolic Panel; Future  -     CBC with Auto Differential; Future  2. Primary hypertension  3. Primary insomnia  -     doxepin (SINEQUAN) 10 MG capsule; Take 1 capsule by mouth nightly, Disp-30 capsule, R-3Normal  4. Psoriasis  -     Clobetasol Propionate 0.05 % SHAM; Apply thin film to dry scalp once daily. Leave in place for 15 minutes then add water, lather and rinse thoroughly. Use for maximum of 2 weeks at a time. , Disp-118 mL, R-0Normal  -     triamcinolone (KENALOG) 0.1 % ointment; Apply topically 2 times daily for 7 days, Topical, 2 TIMES DAILY Starting Fri 3/24/2023, Until Fri 3/31/2023, For 7 days, Disp-30 g, R-1, Normal  5. Anxiety  6. Carcinoma of left female breast, unspecified estrogen receptor status, unspecified site of breast ([facility] [address]  7. Lymphedema  Orders Placed This Encounter   Procedures    Lipid Panel     Standing Status:   Future     Standing Expiration Date:   3/24/2024    Comprehensive Metabolic Panel     Standing Status:   Future     Standing Expiration Date:   3/24/2024    CBC with Auto Differential     Standing Status:   Future     Standing Expiration Date:   3/24/2024     HTN- stable. Continue Bistolic     Insomnia/anxiety- DC Xanax, start Doxepin. Discussed side effect profile     Psoriasis flare- working on getting in with derm, will send steroid cream and shampoo in the meantime for symptom relief    Hx of breast cancer- following with oncology, will follow along     [facility] Oncologist runs labs- will have them done next month. Return in about 6 months (around 2023). Patient agrees with plan as above and has no additional questions at this time.

## 2023-04-10 RX ORDER — NEBIVOLOL 5 MG/1
TABLET ORAL
Qty: 90 TABLET | Refills: 1 | Status: SHIPPED | OUTPATIENT
Start: 2023-04-10

## 2023-04-17 ENCOUNTER — HOSPITAL ENCOUNTER (OUTPATIENT)
Facility: HOSPITAL | Age: 63
Discharge: HOME | End: 2023-04-20
Payer: COMMERCIAL

## 2023-04-17 DIAGNOSIS — M81.0 SENILE OSTEOPOROSIS: ICD-10-CM

## 2023-04-17 LAB — MAMMOGRAPHY, EXTERNAL: NORMAL

## 2023-04-17 PROCEDURE — 77080 DXA BONE DENSITY AXIAL: CPT

## 2023-04-21 RX ORDER — NEBIVOLOL 5 MG/1
5 TABLET ORAL DAILY
Qty: 90 TABLET | Refills: 1 | OUTPATIENT
Start: 2023-04-21

## 2023-04-24 ENCOUNTER — TELEPHONE (OUTPATIENT)
Facility: CLINIC | Age: 63
End: 2023-04-24

## 2023-04-24 DIAGNOSIS — M54.30 SCIATICA, UNSPECIFIED LATERALITY: Primary | ICD-10-CM

## 2023-04-24 NOTE — TELEPHONE ENCOUNTER
----- Message from Nichol Wu sent at 4/24/2023 10:47 AM EDT -----  Subject: Referral Request    Reason for referral request? Patient is wanting to have a referral for   neurology with Dr. Zoya Vazquez at [facility], she use to see him about   years ago, but they told her she would need a new referral. She was seen   at urgent care at [facility], and she is   not able to make a follow up appointment because she is not able to walk,   and she was seen there on Saturday. She does have the phone number of the   office for the neurologist at [phone]. Thank you. Provider patient wants to be referred to(if known):     Provider Phone Number(if known):     Additional Information for Provider?   ---------------------------------------------------------------------------  --------------  [facility]    [phone]; OK to leave message on voicemail  ---------------------------------------------------------------------------  --------------

## 2023-07-13 DIAGNOSIS — F51.01 PRIMARY INSOMNIA: ICD-10-CM

## 2023-07-14 RX ORDER — DOXEPIN HYDROCHLORIDE 10 MG/1
CAPSULE ORAL
Qty: 90 CAPSULE | Refills: 1 | Status: SHIPPED | OUTPATIENT
Start: 2023-07-14

## 2023-08-15 ENCOUNTER — OFFICE VISIT (OUTPATIENT)
Facility: CLINIC | Age: 63
End: 2023-08-15
Payer: COMMERCIAL

## 2023-08-15 VITALS
SYSTOLIC BLOOD PRESSURE: 119 MMHG | RESPIRATION RATE: 18 BRPM | WEIGHT: 145.2 LBS | DIASTOLIC BLOOD PRESSURE: 81 MMHG | HEART RATE: 69 BPM | HEIGHT: 66 IN | TEMPERATURE: 98.2 F | BODY MASS INDEX: 23.33 KG/M2 | OXYGEN SATURATION: 96 %

## 2023-08-15 DIAGNOSIS — M54.16 LUMBAR RADICULOPATHY: Primary | ICD-10-CM

## 2023-08-15 DIAGNOSIS — G89.29 CHRONIC RIGHT SHOULDER PAIN: ICD-10-CM

## 2023-08-15 DIAGNOSIS — M25.511 CHRONIC RIGHT SHOULDER PAIN: ICD-10-CM

## 2023-08-15 PROCEDURE — 3079F DIAST BP 80-89 MM HG: CPT | Performed by: STUDENT IN AN ORGANIZED HEALTH CARE EDUCATION/TRAINING PROGRAM

## 2023-08-15 PROCEDURE — 99214 OFFICE O/P EST MOD 30 MIN: CPT | Performed by: STUDENT IN AN ORGANIZED HEALTH CARE EDUCATION/TRAINING PROGRAM

## 2023-08-15 PROCEDURE — 3074F SYST BP LT 130 MM HG: CPT | Performed by: STUDENT IN AN ORGANIZED HEALTH CARE EDUCATION/TRAINING PROGRAM

## 2023-08-15 RX ORDER — GABAPENTIN 300 MG/1
300 CAPSULE ORAL 3 TIMES DAILY
COMMUNITY
Start: 2023-08-14

## 2023-08-15 RX ORDER — METHYLPREDNISOLONE 4 MG/1
TABLET ORAL
Qty: 1 KIT | Refills: 0 | Status: SHIPPED | OUTPATIENT
Start: 2023-08-15 | End: 2023-08-21

## 2023-08-15 ASSESSMENT — PATIENT HEALTH QUESTIONNAIRE - PHQ9
10. IF YOU CHECKED OFF ANY PROBLEMS, HOW DIFFICULT HAVE THESE PROBLEMS MADE IT FOR YOU TO DO YOUR WORK, TAKE CARE OF THINGS AT HOME, OR GET ALONG WITH OTHER PEOPLE: 0
3. TROUBLE FALLING OR STAYING ASLEEP: 0
8. MOVING OR SPEAKING SO SLOWLY THAT OTHER PEOPLE COULD HAVE NOTICED. OR THE OPPOSITE, BEING SO FIGETY OR RESTLESS THAT YOU HAVE BEEN MOVING AROUND A LOT MORE THAN USUAL: 0
2. FEELING DOWN, DEPRESSED OR HOPELESS: 0
SUM OF ALL RESPONSES TO PHQ QUESTIONS 1-9: 0
7. TROUBLE CONCENTRATING ON THINGS, SUCH AS READING THE NEWSPAPER OR WATCHING TELEVISION: 0
6. FEELING BAD ABOUT YOURSELF - OR THAT YOU ARE A FAILURE OR HAVE LET YOURSELF OR YOUR FAMILY DOWN: 0
4. FEELING TIRED OR HAVING LITTLE ENERGY: 0
SUM OF ALL RESPONSES TO PHQ QUESTIONS 1-9: 0
1. LITTLE INTEREST OR PLEASURE IN DOING THINGS: 0
SUM OF ALL RESPONSES TO PHQ QUESTIONS 1-9: 0
9. THOUGHTS THAT YOU WOULD BE BETTER OFF DEAD, OR OF HURTING YOURSELF: 0
SUM OF ALL RESPONSES TO PHQ QUESTIONS 1-9: 0
SUM OF ALL RESPONSES TO PHQ9 QUESTIONS 1 & 2: 0
5. POOR APPETITE OR OVEREATING: 0

## 2023-08-15 NOTE — PROGRESS NOTES
SICK VISIT     Samuel Garvey (: 1960) is a 63 y.o. female here for evaluation of the following chief concerns(s):  Shoulder Pain (Right sided shoulder pain - had MRI on Friday at [facility] )       ASSESSMENT/PLAN:  1. Lumbar radiculopathy  -     methylPREDNISolone (MEDROL DOSEPACK) 4 MG tablet; Take by mouth., Disp-1 kit, R-0Normal  2. Chronic right shoulder pain    Orders Placed This Encounter   Procedures    HM MAMMOGRAPHY     This external order was created through the results console. R shoulder pain- following with ortho, S/P MRI and has upcoming apt next week- will follow along     L back pain, radiculopathy- following with neurosurgeon, will send medrol dose pack to help with pain while she gets in with them. Continue Gabapentin. Will follow along closely     FU as scheduled for chronic disease    Patient agrees with plan as above and has no additional questions at this time. SUBJECTIVE/OBJECTIVE:    Patient presents for R shoulder pain, L back and leg pain     Has been having shoulder pain for >1 month now. Was initially going to PT and having some dry needling done which was helping- but insurance has declined to keep doing. Patient is seeing ortho, has apt next week. Just recently got MRI of her shoulder and C-spine-- results not available yet. She has decreased ROM, pain, numbness and tingling in 4th and 5th fingers. She sees neurosurgery for her back--- initially she was having pain with radiculopathy on the right side- this improved with PT/dry needling and now has moved to her left side. She complains of 8/10 shooting pain down to the knee. Pain with movement and rest. No weakness. XR L spine 2023 shows multilevel degenerative changes with severe degenerative disc disease at L2-3 and L5-S1. Lower lumbar facet arthropathy. Osteopenia. Has apt in September with her neurosurgeon- is trying to move this apt up.  Medrol dose pack has helped with pain in the past. Also on

## 2023-08-15 NOTE — PROGRESS NOTES
Stacey Verma presents today for   Chief Complaint   Patient presents with    Shoulder Pain     Right sided shoulder pain - had MRI on Friday at [facility]        Is someone accompanying this pt? no    Is the patient using any DME equipment during [address]? no    Health Maintenance reviewed and discussed and ordered per Provider. Health Maintenance Due   Topic Date Due    HIV screen  Never done    COVID-19 Vaccine (4 - Booster for Moderna series) 12/29/2021    Flu vaccine (1) 08/01/2023   . Coordination of Care:  1. \"Have you been to the ER, urgent care clinic since your last visit? Hospitalized since your last visit? \" No    2. \"Have you seen or consulted any other health care providers outside of the [address] since your last visit? \" No    3. For patients aged 43-73: Has the patient had a colonoscopy? Yes- No care gap present    If the patient is female:    4. For patients aged 43-66: Has the patient had a mammogram within the past 2 years? Yes- No care gap present    5. For patients aged 21-65: Has the patient had a pap smear?  Yes- No care gap present

## 2023-09-11 DIAGNOSIS — F51.01 PRIMARY INSOMNIA: ICD-10-CM

## 2023-09-11 RX ORDER — DOXEPIN HYDROCHLORIDE 10 MG/1
10 CAPSULE ORAL NIGHTLY
Qty: 90 CAPSULE | Refills: 1 | Status: SHIPPED | OUTPATIENT
Start: 2023-09-11

## 2023-09-26 ENCOUNTER — HOSPITAL ENCOUNTER (OUTPATIENT)
Age: 63
Discharge: HOME | End: 2023-09-29
Payer: COMMERCIAL

## 2023-09-26 ENCOUNTER — OFFICE VISIT (OUTPATIENT)
Facility: CLINIC | Age: 63
End: 2023-09-26
Payer: COMMERCIAL

## 2023-09-26 VITALS
HEIGHT: 66 IN | TEMPERATURE: 97.5 F | WEIGHT: 147.4 LBS | DIASTOLIC BLOOD PRESSURE: 81 MMHG | SYSTOLIC BLOOD PRESSURE: 124 MMHG | RESPIRATION RATE: 17 BRPM | BODY MASS INDEX: 23.69 KG/M2 | OXYGEN SATURATION: 100 % | HEART RATE: 65 BPM

## 2023-09-26 DIAGNOSIS — M48.02 CERVICAL STENOSIS OF SPINE: ICD-10-CM

## 2023-09-26 DIAGNOSIS — R07.81 RIB PAIN ON LEFT SIDE: Primary | ICD-10-CM

## 2023-09-26 DIAGNOSIS — Z01.818 PREOP EXAMINATION: ICD-10-CM

## 2023-09-26 DIAGNOSIS — R07.81 RIB PAIN ON LEFT SIDE: ICD-10-CM

## 2023-09-26 DIAGNOSIS — G95.89 MYELOMALACIA OF CERVICAL CORD (HCC): ICD-10-CM

## 2023-09-26 PROCEDURE — 3074F SYST BP LT 130 MM HG: CPT | Performed by: STUDENT IN AN ORGANIZED HEALTH CARE EDUCATION/TRAINING PROGRAM

## 2023-09-26 PROCEDURE — 71101 X-RAY EXAM UNILAT RIBS/CHEST: CPT

## 2023-09-26 PROCEDURE — 99214 OFFICE O/P EST MOD 30 MIN: CPT | Performed by: STUDENT IN AN ORGANIZED HEALTH CARE EDUCATION/TRAINING PROGRAM

## 2023-09-26 PROCEDURE — 3079F DIAST BP 80-89 MM HG: CPT | Performed by: STUDENT IN AN ORGANIZED HEALTH CARE EDUCATION/TRAINING PROGRAM

## 2023-09-26 NOTE — PROGRESS NOTES
PREOPERATIVE EVALUATION    Morales Garvey (: 1960) is a 66 y.o. female here for evaluation of the following chief concerns(s):  Pre-op Exam (Pre-op for surgery on 10/3/23 with Dr. Sabrina Rice)       ASSESSMENT/PLAN:  1. Rib pain on left side  -     XR RIBS LEFT INCLUDE CHEST (MIN 3 VIEWS); Future  2. Preop examination  3. Myelomalacia of cervical cord (HCC)  4. Cervical stenosis of spine    Orders Placed This Encounter   Procedures    XR RIBS LEFT INCLUDE CHEST (MIN 3 VIEWS)     Standing Status:   Future     Number of Occurrences:   1     Standing Expiration Date:   2024     ACC/AHA classification of surgical procedure risk: Moderate risk    Reviewed/ordered lab tests (H/H, Creatinine, EKG). Stable. Mildly abnormal EKG-  shows isolated q wave in lead 3 and isolated poor R wave progression in lead V3--- looks to be seen also on EKG in , normal echo at the time. Otherwise EKG normal. Good functional capacity. H/O ablation for SVT in     Patient is cleared for surgery, pending anesthesia clearance on the day of surgery. Continue meds per discussion with surgenon/anesthesia. Generally I recommend stopping NSAIDS 3 days prior to surgery. Continue BB, continue Statin. Continue most BP meds per surgery/anesthesia discretion. Patient agrees with plan as above and has no additional questions at this time. SUBJECTIVE/OBJECTIVE:  Patient presents for preoperative evaluation   Surgery is C4-C6 ACDF  Date 10/3/2023   Surgeon/location Dr. Diane Randolph [facility])    Hx CAD: No   Hx CHF: No   Hx valvular disease: No  Hx Pulmonary disease: No  Hx T2DM: No  Hx HTN: No  DAWN: No  Tobacco use: No   Heavy alcohol use: No    Pervious history of anesthesia and tolerated well. YES    Functional status, patient can complete >4 METS (climbing flight of stairs, yard work, etc) :  YES     RCI  High risk surgery (0)  CAD (0)   CHF (0)  Hx CVA (0)  Insulin treatment for diabetes (0)   Preoperative serum creatinine >2

## 2023-09-26 NOTE — PROGRESS NOTES
Lolis Salgado presents today for   Chief Complaint   Patient presents with    Pre-op Exam     Pre-op for surgery on 10/3/23 with Dr. Mikal Hall       Is someone accompanying this pt? no    Is the patient using any DME equipment during OV? no    Health Maintenance reviewed and discussed and ordered per Provider. Health Maintenance Due   Topic Date Due    HIV screen  Never done    COVID-19 Vaccine (4 - Moderna series) 12/29/2021    Flu vaccine (1) 08/01/2023   . Coordination of Care:  1. \"Have you been to the ER, urgent care clinic since your last visit? Hospitalized since your last visit? \" No    2. \"Have you seen or consulted any other health care providers outside of the [address] [place] [address] since your last visit? \" No    3. For patients aged 43-73: Has the patient had a colonoscopy? Yes- No care gap present    If the patient is female:    4. For patients aged 43-66: Has the patient had a mammogram within the past 2 years? Yes- No care gap present    5. For patients aged 21-65: Has the patient had a pap smear?  Yes- No care gap present

## 2023-09-29 RX ORDER — NEBIVOLOL 5 MG/1
TABLET ORAL
Qty: 90 TABLET | Refills: 1 | Status: SHIPPED | OUTPATIENT
Start: 2023-09-29

## 2023-12-26 DIAGNOSIS — F51.01 PRIMARY INSOMNIA: ICD-10-CM

## 2023-12-26 RX ORDER — DOXEPIN HYDROCHLORIDE 10 MG/1
10 CAPSULE ORAL NIGHTLY
Qty: 90 CAPSULE | Refills: 1 | Status: SHIPPED | OUTPATIENT
Start: 2023-12-26

## 2024-02-13 ENCOUNTER — TELEPHONE (OUTPATIENT)
Facility: CLINIC | Age: 64
End: 2024-02-13

## 2024-02-13 NOTE — TELEPHONE ENCOUNTER
Amanda a nurse from [facility] cancer specialist called stating they had concerns two medication the patient was taking.  Doxepin and Effexor because they can have bad side effects if taken together and wanted to know if the Dr was aware of this.     Call back number is [phone]

## 2024-04-10 ENCOUNTER — PATIENT MESSAGE (OUTPATIENT)
Facility: CLINIC | Age: 64
End: 2024-04-10

## 2024-04-10 RX ORDER — NEBIVOLOL 5 MG/1
5 TABLET ORAL DAILY
Qty: 90 TABLET | Refills: 0 | Status: SHIPPED | OUTPATIENT
Start: 2024-04-10

## 2024-04-10 NOTE — TELEPHONE ENCOUNTER
From: Sabra Garvey  To: Dr. Evi Guzman  Sent: 4/10/2024 10:42 AM EDT  Subject: Medications     I need a refill on my prescription Nebbiolo 5 mg  Also my oncologist said to stop Doxepin and take something else, could you please prescribe another anxiety medication for me   Thank you l

## 2024-04-11 RX ORDER — NEBIVOLOL 5 MG/1
5 TABLET ORAL DAILY
Qty: 90 TABLET | Refills: 0 | OUTPATIENT
Start: 2024-04-11

## 2024-07-16 PROBLEM — G95.89 MYELOMALACIA OF CERVICAL CORD (HCC): Status: ACTIVE | Noted: 2023-08-24

## 2024-07-16 PROBLEM — M85.89 OSTEOPENIA OF MULTIPLE SITES: Status: ACTIVE | Noted: 2023-04-28

## 2024-07-16 PROBLEM — Z98.1 STATUS POST CERVICAL SPINAL FUSION: Status: ACTIVE | Noted: 2023-11-02

## 2024-07-16 PROBLEM — R29.2 HYPERREFLEXIA: Status: ACTIVE | Noted: 2023-08-24

## 2024-07-19 RX ORDER — NEBIVOLOL 5 MG/1
5 TABLET ORAL DAILY
Qty: 90 TABLET | Refills: 1 | Status: SHIPPED | OUTPATIENT
Start: 2024-07-19

## 2024-07-23 ENCOUNTER — OFFICE VISIT (OUTPATIENT)
Facility: CLINIC | Age: 64
End: 2024-07-23
Payer: COMMERCIAL

## 2024-07-23 VITALS
WEIGHT: 147 LBS | DIASTOLIC BLOOD PRESSURE: 76 MMHG | HEIGHT: 66 IN | BODY MASS INDEX: 23.63 KG/M2 | SYSTOLIC BLOOD PRESSURE: 108 MMHG | HEART RATE: 69 BPM | TEMPERATURE: 97.3 F | OXYGEN SATURATION: 99 % | RESPIRATION RATE: 17 BRPM

## 2024-07-23 DIAGNOSIS — M85.89 OSTEOPENIA OF MULTIPLE SITES: ICD-10-CM

## 2024-07-23 DIAGNOSIS — H66.001 NON-RECURRENT ACUTE SUPPURATIVE OTITIS MEDIA OF RIGHT EAR WITHOUT SPONTANEOUS RUPTURE OF TYMPANIC MEMBRANE: Primary | ICD-10-CM

## 2024-07-23 DIAGNOSIS — I89.0 LYMPHEDEMA: ICD-10-CM

## 2024-07-23 DIAGNOSIS — I10 PRIMARY HYPERTENSION: ICD-10-CM

## 2024-07-23 DIAGNOSIS — F51.01 PRIMARY INSOMNIA: ICD-10-CM

## 2024-07-23 DIAGNOSIS — C50.912 CARCINOMA OF LEFT FEMALE BREAST, UNSPECIFIED ESTROGEN RECEPTOR STATUS, UNSPECIFIED SITE OF BREAST (HCC): ICD-10-CM

## 2024-07-23 DIAGNOSIS — L40.9 PSORIASIS: ICD-10-CM

## 2024-07-23 DIAGNOSIS — H92.01 RIGHT EAR PAIN: ICD-10-CM

## 2024-07-23 PROBLEM — R29.2 HYPERREFLEXIA: Status: RESOLVED | Noted: 2023-08-24 | Resolved: 2024-07-23

## 2024-07-23 PROCEDURE — 99214 OFFICE O/P EST MOD 30 MIN: CPT | Performed by: STUDENT IN AN ORGANIZED HEALTH CARE EDUCATION/TRAINING PROGRAM

## 2024-07-23 PROCEDURE — 99396 PREV VISIT EST AGE 40-64: CPT | Performed by: STUDENT IN AN ORGANIZED HEALTH CARE EDUCATION/TRAINING PROGRAM

## 2024-07-23 PROCEDURE — 3074F SYST BP LT 130 MM HG: CPT | Performed by: STUDENT IN AN ORGANIZED HEALTH CARE EDUCATION/TRAINING PROGRAM

## 2024-07-23 PROCEDURE — 3078F DIAST BP <80 MM HG: CPT | Performed by: STUDENT IN AN ORGANIZED HEALTH CARE EDUCATION/TRAINING PROGRAM

## 2024-07-23 RX ORDER — DOXYCYCLINE HYCLATE 100 MG
100 TABLET ORAL 2 TIMES DAILY
Qty: 14 TABLET | Refills: 0 | Status: SHIPPED | OUTPATIENT
Start: 2024-07-23 | End: 2024-07-30

## 2024-07-23 ASSESSMENT — ENCOUNTER SYMPTOMS
GASTROINTESTINAL NEGATIVE: 1
RESPIRATORY NEGATIVE: 1

## 2024-07-23 NOTE — PROGRESS NOTES
Sabra Garvey presents today for   Chief Complaint   Patient presents with    Follow-up     9M FOLLOW UP     Anxiety     Oncologist took her off of doxepin, would like to discuss alternative medications       Is someone accompanying this pt? no    Is the patient using any DME equipment during OV? no    Health Maintenance Due   Topic Date Due    HIV screen  Never done    Respiratory Syncytial Virus (RSV) Pregnant or age 60 yrs+ (1 - 1-dose 60+ series) Never done    COVID-19 Vaccine (4 - 2023-24 season) 09/01/2023    Depression Screen  08/15/2024       \"Have you been to the ER, urgent care clinic since your last visit?  Hospitalized since your last visit?\"    NO    “Have you seen or consulted any other health care providers outside of [facility] since your last visit?”    NO            
sounds. No murmur heard.     No gallop.   Pulmonary:      Effort: Pulmonary effort is normal.      Breath sounds: Normal breath sounds.   Abdominal:      General: Abdomen is flat.      Palpations: Abdomen is soft.   Musculoskeletal:         General: No swelling, tenderness or deformity.      Cervical back: Neck supple.   Lymphadenopathy:      Cervical: No cervical adenopathy.   Skin:     General: Skin is warm and dry.   Neurological:      General: No focal deficit present.      Mental Status: She is alert. Mental status is at baseline.   Psychiatric:         Mood and Affect: Mood normal.         Behavior: Behavior normal.         Thought Content: Thought content normal.         Judgment: Judgment normal.             Assessment & Plan   Non-recurrent acute suppurative otitis media of right ear without spontaneous rupture of tympanic membrane  -     doxycycline hyclate (VIBRA-TABS) 100 MG tablet; Take 1 tablet by mouth 2 times daily for 7 days, Disp-14 tablet, R-0Normal  Carcinoma of left female breast, unspecified estrogen receptor status, unspecified site of breast (HCC)  Lymphedema  Osteopenia of multiple sites  Primary hypertension  Primary insomnia  Psoriasis  Right ear pain       HTN- stable. Continue Bistolic      Insomnia/anxiety- Trial of Magnesium L- threonate, sleep hygiene. Patient does not want to try Ambien or restart Xanax (not indicated for sleep anyhow- we discussed)---- all other sleep medicines unfortunately have some risk of SS and her oncologist is concerned about this.      Psoriasis- stable currently     Hx of breast cancer- following with oncology, will follow along     Osteoporosis- started Prolia, managed by oncology. Will follow.     Bulging R TM, ear itching/pain- Trial of Flonase. Will send abx for otitis media if not clearing up.      Reviewed labs from oncology 4/2024- stable    Return in about 1 year (around 7/23/2025) for physical.     Personalized Preventive Plan  Current Health

## 2024-08-06 ENCOUNTER — OFFICE VISIT (OUTPATIENT)
Facility: CLINIC | Age: 64
End: 2024-08-06
Payer: COMMERCIAL

## 2024-08-06 ENCOUNTER — HOSPITAL ENCOUNTER (OUTPATIENT)
Age: 64
Discharge: HOME | End: 2024-08-09
Payer: COMMERCIAL

## 2024-08-06 VITALS
HEIGHT: 66 IN | BODY MASS INDEX: 23.91 KG/M2 | TEMPERATURE: 96.8 F | WEIGHT: 148.8 LBS | HEART RATE: 70 BPM | SYSTOLIC BLOOD PRESSURE: 101 MMHG | DIASTOLIC BLOOD PRESSURE: 70 MMHG | OXYGEN SATURATION: 97 % | RESPIRATION RATE: 18 BRPM

## 2024-08-06 DIAGNOSIS — Z01.818 PREOP EXAMINATION: Primary | ICD-10-CM

## 2024-08-06 DIAGNOSIS — Z01.818 PREOP EXAMINATION: ICD-10-CM

## 2024-08-06 DIAGNOSIS — M25.511 RIGHT SHOULDER PAIN, UNSPECIFIED CHRONICITY: ICD-10-CM

## 2024-08-06 LAB
ANION GAP SERPL CALC-SCNC: 4 MMOL/L (ref 3–18)
BASOPHILS # BLD: 0 K/UL (ref 0–0.1)
BASOPHILS NFR BLD: 1 % (ref 0–2)
BUN SERPL-MCNC: 15 MG/DL (ref 7–18)
BUN/CREAT SERPL: 22 (ref 12–20)
CA-I BLD-MCNC: 9.2 MG/DL (ref 8.5–10.1)
CHLORIDE SERPL-SCNC: 108 MMOL/L (ref 100–111)
CO2 SERPL-SCNC: 28 MMOL/L (ref 21–32)
CREAT SERPL-MCNC: 0.67 MG/DL (ref 0.6–1.3)
DIFFERENTIAL METHOD BLD: ABNORMAL
EOSINOPHIL # BLD: 0.1 K/UL (ref 0–0.4)
EOSINOPHIL NFR BLD: 2 % (ref 0–5)
ERYTHROCYTE [DISTWIDTH] IN BLOOD BY AUTOMATED COUNT: 12 % (ref 11.6–14.5)
GLUCOSE SERPL-MCNC: 74 MG/DL (ref 74–99)
HCT VFR BLD AUTO: 40.3 % (ref 35–45)
HGB BLD-MCNC: 13.6 G/DL (ref 12–16)
IMM GRANULOCYTES # BLD AUTO: 0 K/UL (ref 0–0.04)
IMM GRANULOCYTES NFR BLD AUTO: 1 % (ref 0–0.5)
LYMPHOCYTES # BLD: 0.9 K/UL (ref 0.9–3.6)
LYMPHOCYTES NFR BLD: 22 % (ref 21–52)
MCH RBC QN AUTO: 32.4 PG (ref 24–34)
MCHC RBC AUTO-ENTMCNC: 33.7 G/DL (ref 31–37)
MCV RBC AUTO: 96 FL (ref 78–100)
MONOCYTES # BLD: 0.4 K/UL (ref 0.05–1.2)
MONOCYTES NFR BLD: 11 % (ref 3–10)
NEUTS SEG # BLD: 2.6 K/UL (ref 1.8–8)
NEUTS SEG NFR BLD: 63 % (ref 40–73)
NRBC # BLD: 0 K/UL (ref 0–0.01)
NRBC BLD-RTO: 0 PER 100 WBC
PLATELET # BLD AUTO: 235 K/UL (ref 135–420)
PMV BLD AUTO: 10.8 FL (ref 9.2–11.8)
POTASSIUM SERPL-SCNC: 3.8 MMOL/L (ref 3.5–5.5)
RBC # BLD AUTO: 4.2 M/UL (ref 4.2–5.3)
SODIUM SERPL-SCNC: 140 MMOL/L (ref 136–145)
WBC # BLD AUTO: 4 K/UL (ref 4.6–13.2)

## 2024-08-06 PROCEDURE — 36415 COLL VENOUS BLD VENIPUNCTURE: CPT

## 2024-08-06 PROCEDURE — 80048 BASIC METABOLIC PNL TOTAL CA: CPT

## 2024-08-06 PROCEDURE — 3078F DIAST BP <80 MM HG: CPT | Performed by: STUDENT IN AN ORGANIZED HEALTH CARE EDUCATION/TRAINING PROGRAM

## 2024-08-06 PROCEDURE — 93000 ELECTROCARDIOGRAM COMPLETE: CPT | Performed by: STUDENT IN AN ORGANIZED HEALTH CARE EDUCATION/TRAINING PROGRAM

## 2024-08-06 PROCEDURE — 99215 OFFICE O/P EST HI 40 MIN: CPT | Performed by: STUDENT IN AN ORGANIZED HEALTH CARE EDUCATION/TRAINING PROGRAM

## 2024-08-06 PROCEDURE — 85025 COMPLETE CBC W/AUTO DIFF WBC: CPT

## 2024-08-06 PROCEDURE — 3074F SYST BP LT 130 MM HG: CPT | Performed by: STUDENT IN AN ORGANIZED HEALTH CARE EDUCATION/TRAINING PROGRAM

## 2024-08-06 NOTE — PROGRESS NOTES
PREOPERATIVE EVALUATION    Sabra Garvey (: 1960) is a 63 y.o. female here for evaluation of the following chief concerns(s):  Pre-op Exam       ASSESSMENT/PLAN:  1. Preop examination  -     CBC with Auto Differential; Future  -     Basic Metabolic Panel; Future  -     EKG 12 Lead  2. Right shoulder pain, unspecified chronicity  -     CBC with Auto Differential; Future  -     Basic Metabolic Panel; Future    Orders Placed This Encounter   Procedures    CBC with Auto Differential     Standing Status:   Future     Standing Expiration Date:   2025    Basic Metabolic Panel     Standing Status:   Future     Standing Expiration Date:   2025    EKG 12 Lead     Order Specific Question:   Reason for Exam?     Answer:   Pre-op     ACC/AHA classification of surgical procedure risk:   - Moderate risk  Reviewed/ordered lab tests (H/H, Creatinine, EKG)    EKG with Q waves in anterior leads- stable from prior EKG's, h/o ablation- very good functional capacity    Patient is cleared for surgery, pending anesthesia clearance on the day of surgery.    Continue meds per discussion with surgenon/anesthesia    SUBJECTIVE/OBJECTIVE:  Patient presents for preoperative evaluation   Surgery is R shoulder arthroplasty  Date   Surgeon/location [facility]    Hx CAD: No   Hx CHF: No   Hx valvular disease: No  Hx Pulmonary disease: No  Hx T2DM: No  Hx HTN: No  DAWN: No  Tobacco use: No   Heavy alcohol use: No    Pervious history of anesthesia and tolerated well. YES    Functional status, patient can complete >4 METS (climbing flight of stairs, yard work, etc) : YES     RCI  High risk surgery (0)  CAD (0)   CHF (0)  Hx CVA (0)  Insulin treatment for diabetes (0)   Preoperative serum creatinine >2 (0)     Total   0= class 1 risk (0.4%)   Risk of major cardiac event    Past Medical History:   Diagnosis Date    Anxiety 2015    Arrhythmia     ABLATION [facility]ACH[facility]at [facility]    Cancer (HCC)     breast

## 2024-08-06 NOTE — PROGRESS NOTES
Sabra Garvey presents today for   Chief Complaint   Patient presents with    Pre-op Exam       Is someone accompanying this pt? no    Is the patient using any DME equipment during OV? no    Health Maintenance Due   Topic Date Due    HIV screen  Never done    Respiratory Syncytial Virus (RSV) Pregnant or age 60 yrs+ (1 - 1-dose 60+ series) Never done    COVID-19 Vaccine (4 - 2023-24 season) 09/01/2023    Flu vaccine (1) 08/01/2024       \"Have you been to the ER, urgent care clinic since your last visit?  Hospitalized since your last visit?\"    NO    “Have you seen or consulted any other health care providers outside of [facility] since your last visit?”    NO

## 2024-08-21 ENCOUNTER — APPOINTMENT (OUTPATIENT)
Age: 64
DRG: 603 | End: 2024-08-21
Attending: FAMILY MEDICINE
Payer: COMMERCIAL

## 2024-08-21 ENCOUNTER — HOSPITAL ENCOUNTER (INPATIENT)
Age: 64
LOS: 1 days | Discharge: HOME | DRG: 603 | End: 2024-08-22
Attending: FAMILY MEDICINE | Admitting: INTERNAL MEDICINE
Payer: COMMERCIAL

## 2024-08-21 DIAGNOSIS — Z85.3 HISTORY OF BREAST CANCER IN FEMALE: ICD-10-CM

## 2024-08-21 DIAGNOSIS — L03.114 LEFT ARM CELLULITIS: Primary | ICD-10-CM

## 2024-08-21 DIAGNOSIS — Z98.890 HISTORY OF LYMPH NODE DISSECTION OF LEFT AXILLA: ICD-10-CM

## 2024-08-21 LAB
ALBUMIN SERPL-MCNC: 3.6 G/DL (ref 3.4–5)
ALBUMIN/GLOB SERPL: 1 (ref 0.8–1.7)
ALP SERPL-CCNC: 69 U/L (ref 45–117)
ALT SERPL-CCNC: 31 U/L (ref 13–56)
ANION GAP SERPL CALC-SCNC: 7 MMOL/L (ref 3–18)
AST SERPL W P-5'-P-CCNC: 20 U/L (ref 10–38)
BASOPHILS # BLD: 0 K/UL (ref 0–0.1)
BASOPHILS NFR BLD: 0 % (ref 0–2)
BILIRUB SERPL-MCNC: 0.7 MG/DL (ref 0.2–1)
BUN SERPL-MCNC: 11 MG/DL (ref 7–18)
BUN/CREAT SERPL: 16 (ref 12–20)
CA-I BLD-MCNC: 9.1 MG/DL (ref 8.5–10.1)
CHLORIDE SERPL-SCNC: 103 MMOL/L (ref 100–111)
CO2 SERPL-SCNC: 27 MMOL/L (ref 21–32)
CREAT SERPL-MCNC: 0.68 MG/DL (ref 0.6–1.3)
DIFFERENTIAL METHOD BLD: ABNORMAL
EOSINOPHIL # BLD: 0 K/UL (ref 0–0.4)
EOSINOPHIL NFR BLD: 0 % (ref 0–5)
ERYTHROCYTE [DISTWIDTH] IN BLOOD BY AUTOMATED COUNT: 12 % (ref 11.6–14.5)
GLOBULIN SER CALC-MCNC: 3.5 G/DL (ref 2–4)
GLUCOSE SERPL-MCNC: 105 MG/DL (ref 74–99)
HCT VFR BLD AUTO: 39.1 % (ref 35–45)
HGB BLD-MCNC: 13.4 G/DL (ref 12–16)
IMM GRANULOCYTES # BLD AUTO: 0.1 K/UL (ref 0–0.04)
IMM GRANULOCYTES NFR BLD AUTO: 1 % (ref 0–0.5)
LACTATE SERPL-SCNC: 1.7 MMOL/L (ref 0.4–2)
LYMPHOCYTES # BLD: 0.8 K/UL (ref 0.9–3.6)
LYMPHOCYTES NFR BLD: 7 % (ref 21–52)
MCH RBC QN AUTO: 32.1 PG (ref 24–34)
MCHC RBC AUTO-ENTMCNC: 34.3 G/DL (ref 31–37)
MCV RBC AUTO: 93.5 FL (ref 78–100)
MONOCYTES # BLD: 0.5 K/UL (ref 0.05–1.2)
MONOCYTES NFR BLD: 5 % (ref 3–10)
NEUTS SEG # BLD: 9.3 K/UL (ref 1.8–8)
NEUTS SEG NFR BLD: 87 % (ref 40–73)
NRBC # BLD: 0 K/UL (ref 0–0.01)
NRBC BLD-RTO: 0 PER 100 WBC
PLATELET # BLD AUTO: 242 K/UL (ref 135–420)
PMV BLD AUTO: 10.5 FL (ref 9.2–11.8)
POTASSIUM SERPL-SCNC: 3.5 MMOL/L (ref 3.5–5.5)
PROCALCITONIN SERPL-MCNC: <0.05 NG/ML
PROT SERPL-MCNC: 7.1 G/DL (ref 6.4–8.2)
RBC # BLD AUTO: 4.18 M/UL (ref 4.2–5.3)
SODIUM SERPL-SCNC: 137 MMOL/L (ref 136–145)
WBC # BLD AUTO: 10.6 K/UL (ref 4.6–13.2)

## 2024-08-21 PROCEDURE — 84145 PROCALCITONIN (PCT): CPT

## 2024-08-21 PROCEDURE — 6370000000 HC RX 637 (ALT 250 FOR IP): Performed by: NURSE PRACTITIONER

## 2024-08-21 PROCEDURE — 99285 EMERGENCY DEPT VISIT HI MDM: CPT

## 2024-08-21 PROCEDURE — 83605 ASSAY OF LACTIC ACID: CPT

## 2024-08-21 PROCEDURE — 85025 COMPLETE CBC W/AUTO DIFF WBC: CPT

## 2024-08-21 PROCEDURE — 6360000002 HC RX W HCPCS: Performed by: FAMILY MEDICINE

## 2024-08-21 PROCEDURE — 93971 EXTREMITY STUDY: CPT

## 2024-08-21 PROCEDURE — 80053 COMPREHEN METABOLIC PANEL: CPT

## 2024-08-21 PROCEDURE — 36415 COLL VENOUS BLD VENIPUNCTURE: CPT

## 2024-08-21 PROCEDURE — 1100000000 HC RM PRIVATE

## 2024-08-21 PROCEDURE — 87040 BLOOD CULTURE FOR BACTERIA: CPT

## 2024-08-21 PROCEDURE — 6370000000 HC RX 637 (ALT 250 FOR IP): Performed by: FAMILY MEDICINE

## 2024-08-21 RX ORDER — VENLAFAXINE 37.5 MG/1
75 TABLET ORAL 2 TIMES DAILY
Status: DISCONTINUED | OUTPATIENT
Start: 2024-08-21 | End: 2024-08-22 | Stop reason: HOSPADM

## 2024-08-21 RX ORDER — METOPROLOL SUCCINATE 25 MG/1
25 TABLET, EXTENDED RELEASE ORAL DAILY
Status: DISCONTINUED | OUTPATIENT
Start: 2024-08-22 | End: 2024-08-22 | Stop reason: HOSPADM

## 2024-08-21 RX ORDER — HYDROCODONE BITARTRATE AND ACETAMINOPHEN 5; 325 MG/1; MG/1
1 TABLET ORAL
Status: COMPLETED | OUTPATIENT
Start: 2024-08-21 | End: 2024-08-21

## 2024-08-21 RX ORDER — DOXYCYCLINE HYCLATE 100 MG
100 TABLET ORAL EVERY 12 HOURS SCHEDULED
Status: DISCONTINUED | OUTPATIENT
Start: 2024-08-21 | End: 2024-08-22 | Stop reason: HOSPADM

## 2024-08-21 RX ORDER — EXEMESTANE 25 MG/1
25 TABLET ORAL DAILY
Status: DISCONTINUED | OUTPATIENT
Start: 2024-08-21 | End: 2024-08-22 | Stop reason: HOSPADM

## 2024-08-21 RX ORDER — FAMOTIDINE 20 MG/1
20 TABLET, FILM COATED ORAL DAILY
Status: DISCONTINUED | OUTPATIENT
Start: 2024-08-21 | End: 2024-08-22 | Stop reason: HOSPADM

## 2024-08-21 RX ORDER — ONDANSETRON 4 MG/1
4 TABLET, ORALLY DISINTEGRATING ORAL EVERY 8 HOURS PRN
Status: DISCONTINUED | OUTPATIENT
Start: 2024-08-21 | End: 2024-08-22 | Stop reason: HOSPADM

## 2024-08-21 RX ORDER — CLINDAMYCIN PHOSPHATE 600 MG/50ML
600 INJECTION, SOLUTION INTRAVENOUS EVERY 8 HOURS
Status: DISCONTINUED | OUTPATIENT
Start: 2024-08-22 | End: 2024-08-22 | Stop reason: HOSPADM

## 2024-08-21 RX ORDER — CLINDAMYCIN PHOSPHATE 150 MG/ML
600 INJECTION, SOLUTION INTRAVENOUS EVERY 8 HOURS
Status: DISCONTINUED | OUTPATIENT
Start: 2024-08-21 | End: 2024-08-21

## 2024-08-21 RX ORDER — VIT A/VIT C/VIT E/ZINC/COPPER 2148-113
1 TABLET ORAL DAILY
Status: DISCONTINUED | OUTPATIENT
Start: 2024-08-21 | End: 2024-08-22 | Stop reason: HOSPADM

## 2024-08-21 RX ORDER — CLINDAMYCIN PHOSPHATE 900 MG/50ML
900 INJECTION, SOLUTION INTRAVENOUS
Status: COMPLETED | OUTPATIENT
Start: 2024-08-21 | End: 2024-08-21

## 2024-08-21 RX ORDER — ACETAMINOPHEN 325 MG/1
650 TABLET ORAL EVERY 4 HOURS PRN
Status: DISCONTINUED | OUTPATIENT
Start: 2024-08-21 | End: 2024-08-22 | Stop reason: HOSPADM

## 2024-08-21 RX ORDER — HYDROCODONE BITARTRATE AND ACETAMINOPHEN 5; 325 MG/1; MG/1
1 TABLET ORAL EVERY 6 HOURS PRN
Status: DISCONTINUED | OUTPATIENT
Start: 2024-08-21 | End: 2024-08-22 | Stop reason: HOSPADM

## 2024-08-21 RX ADMIN — ACETAMINOPHEN 650 MG: 325 TABLET ORAL at 21:43

## 2024-08-21 RX ADMIN — HYDROCODONE BITARTRATE AND ACETAMINOPHEN 1 TABLET: 5; 325 TABLET ORAL at 14:10

## 2024-08-21 RX ADMIN — ACETAMINOPHEN 650 MG: 325 TABLET ORAL at 17:52

## 2024-08-21 RX ADMIN — VENLAFAXINE 75 MG: 37.5 TABLET ORAL at 21:40

## 2024-08-21 RX ADMIN — CLINDAMYCIN PHOSPHATE 900 MG: 900 INJECTION, SOLUTION INTRAVENOUS at 17:42

## 2024-08-21 RX ADMIN — DOXYCYCLINE HYCLATE 100 MG: 100 TABLET, COATED ORAL at 21:40

## 2024-08-21 ASSESSMENT — PAIN DESCRIPTION - ONSET
ONSET: AWAKENED FROM SLEEP
ONSET: ON-GOING

## 2024-08-21 ASSESSMENT — PAIN DESCRIPTION - LOCATION
LOCATION: HEAD;ARM
LOCATION: ARM

## 2024-08-21 ASSESSMENT — PAIN SCALES - GENERAL
PAINLEVEL_OUTOF10: 5
PAINLEVEL_OUTOF10: 4
PAINLEVEL_OUTOF10: 4
PAINLEVEL_OUTOF10: 5
PAINLEVEL_OUTOF10: 5

## 2024-08-21 ASSESSMENT — PAIN DESCRIPTION - FREQUENCY
FREQUENCY: CONTINUOUS
FREQUENCY: CONTINUOUS

## 2024-08-21 ASSESSMENT — PAIN DESCRIPTION - DESCRIPTORS
DESCRIPTORS: ACHING
DESCRIPTORS: SHARP;SHOOTING

## 2024-08-21 ASSESSMENT — PAIN DESCRIPTION - ORIENTATION
ORIENTATION: LEFT

## 2024-08-21 ASSESSMENT — PAIN DESCRIPTION - PAIN TYPE
TYPE: ACUTE PAIN
TYPE: ACUTE PAIN

## 2024-08-21 ASSESSMENT — PAIN - FUNCTIONAL ASSESSMENT
PAIN_FUNCTIONAL_ASSESSMENT: ACTIVITIES ARE NOT PREVENTED
PAIN_FUNCTIONAL_ASSESSMENT: ACTIVITIES ARE NOT PREVENTED

## 2024-08-21 NOTE — ED TRIAGE NOTES
States that she woke up this morning with her left arm painful and hot to touch. She has a history of breast cancer and lymphedema on the left but this is abnormal for her. She states that \" a mosquito may have gotten me\". Left arm is not swollen, red, warm and painful.

## 2024-08-21 NOTE — ED PROVIDER NOTES
[facility] EMERGENCY DEPT  EMERGENCY DEPARTMENT ENCOUNTER      Pt Name: Sabra Garvey  MRN: [id number]  Birthdate 1960  Date of evaluation: 8/21/2024  Provider: Victoriano Ibarra DO  3:54 PM    CHIEF COMPLAINT     Swelling left arm      HISTORY OF PRESENT ILLNESS    Sabra Garvey is a 63 y.o. female who presents to the emergency department patient comes in stating she is having some swelling in her left arm.  Oncologist feels that this is more of a cellulitic component she states is extremely tender rates her pain is a 8 out of 10.  Just to even touch her arm movement makes it worse as well.  She did states she was running a fever of 101.  Has been using some ibuprofen for pain.  History of breast cancer in the left side.  She states her whole body aches.    \[facility]\""    Nursing Notes were reviewed.    REVIEW OF SYSTEMS           PAST MEDICAL HISTORY     Past Medical History:   Diagnosis Date    Anxiety 2015    Arrhythmia 1992    ABLATION FOR VTACH-at [facility]    Cancer (HCC)     breast cancer-left    Hearing loss 2021    Hypertension     Nausea & vomiting     Osteoarthritis 2023    Vitamin D deficiency 12/14/2020         SURGICAL HISTORY       Past Surgical History:   Procedure Laterality Date    BREAST SURGERY  2015    augmentation    BREAST SURGERY Left 7/2015    left lumpectomy and lymph nodes remove    COLONOSCOPY N/A 4/22/2016    COLONOSCOPY performed by Surekha Carter MD at [facility] ENDOSCOPY    COSMETIC SURGERY  2009    Implants    DILATION AND CURETTAGE OF UTERUS      HYSTERECTOMY (CERVIX STATUS UNKNOWN)      NECK SURGERY  10/2023    VASCULAR SURGERY  2/2015    mediport placement for chemo    WISDOM TOOTH EXTRACTION           CURRENT MEDICATIONS       Previous Medications    ACETAMINOPHEN (TYLENOL ARTHRITIS PAIN PO)    Take by mouth    CLOBETASOL PROPIONATE 0.05 % SHAM    Apply thin film to dry scalp once daily. Leave in place for 15 minutes then add water, lather and rinse thoroughly. Use for maximum

## 2024-08-21 NOTE — H&P
Current packs/day: 0.00     Average packs/day: 0.5 packs/day for 7.5 years (3.7 ttl pk-yrs)     Types: Cigarettes     Start date: 1982     Quit date: 1989     Years since quittin.1    Smokeless tobacco: Never   Substance Use Topics    Alcohol use: Yes     Comment: socially       Prior to Admission medications    Medication Sig Start Date End Date Taking? Authorizing Provider   nebivolol (BYSTOLIC) 5 MG tablet Take 1 tablet by mouth daily 24   Evi Guzman MD   Clobetasol Propionate 0.05 % SHAM Apply thin film to dry scalp once daily. Leave in place for 15 minutes then add water, lather and rinse thoroughly. Use for maximum of 2 weeks at a time. 24   Evi Guzman MD   Acetaminophen (TYLENOL ARTHRITIS PAIN PO) Take by mouth    ProviderAlize MD   Multiple Vitamins-Minerals (CENTRUM SILVER ULTRA WOMENS) TABS Take by mouth    Alize Bryson MD   Multiple Vitamins-Minerals (PRESERVISION AREDS PO) Take by mouth    Alize Bryson MD   famotidine (PEPCID) 20 MG tablet Take 1 tablet by mouth daily    ProviderAlize MD   exemestane (AROMASIN) 25 MG tablet Take 1 tablet by mouth daily 10/27/20   Automatic Reconciliation, Ar   venlafaxine (EFFEXOR) 75 MG tablet Take 1 tablet by mouth 2 times daily    Automatic Reconciliation, Ar     Allergies   Allergen Reactions    Penicillins Anaphylaxis, Angioedema and Hives     Other reaction(s): Unknown (comments)    Other Other (See Comments)     Band-aids        Review of Systems:  Negative except as mentioned in HPI.       Objective:     Vitals:  /84   Pulse 98   Temp 98.6 °F (37 °C)   Resp 12   Wt 65.8 kg (145 lb)   SpO2 100%   BMI 23.40 kg/m²     Physical Exam:  General: Alert, awake, not ill or toxic appearing, cooperative, in no acute distress. On RA.  Head:  Normocephalic, without obvious abnormality, atraumatic.  Eyes:  Conjunctivae/corneas clear. Pupils equal, round, reactive to light. EOMI. No icterus.

## 2024-08-21 NOTE — ED NOTES
TRANSFER - OUT REPORT:    Verbal report given to Elias on Sabra Garvey  being transferred to 241 for routine progression of patient care       Report consisted of patient's Situation, Background, Assessment and   Recommendations(SBAR).     Information from the following report(s) ED Encounter Summary and Recent Results was reviewed with the receiving nurse.    [address] Fall Assessment:                           Lines:   Peripheral IV 08/21/24 Right Antecubital (Active)        Opportunity for questions and clarification was provided.      Patient transported with:  Registered Nurse

## 2024-08-22 VITALS
TEMPERATURE: 98.1 F | SYSTOLIC BLOOD PRESSURE: 95 MMHG | OXYGEN SATURATION: 99 % | HEIGHT: 66 IN | RESPIRATION RATE: 18 BRPM | HEART RATE: 65 BPM | WEIGHT: 145 LBS | DIASTOLIC BLOOD PRESSURE: 72 MMHG | BODY MASS INDEX: 23.3 KG/M2

## 2024-08-22 LAB
ANION GAP SERPL CALC-SCNC: 8 MMOL/L (ref 3–18)
BASOPHILS # BLD: 0 K/UL (ref 0–0.1)
BASOPHILS NFR BLD: 0 % (ref 0–2)
BUN SERPL-MCNC: 11 MG/DL (ref 7–18)
BUN/CREAT SERPL: 17 (ref 12–20)
CA-I BLD-MCNC: 8.7 MG/DL (ref 8.5–10.1)
CHLORIDE SERPL-SCNC: 106 MMOL/L (ref 100–111)
CO2 SERPL-SCNC: 27 MMOL/L (ref 21–32)
CREAT SERPL-MCNC: 0.63 MG/DL (ref 0.6–1.3)
DIFFERENTIAL METHOD BLD: ABNORMAL
EOSINOPHIL # BLD: 0.1 K/UL (ref 0–0.4)
EOSINOPHIL NFR BLD: 1 % (ref 0–5)
ERYTHROCYTE [DISTWIDTH] IN BLOOD BY AUTOMATED COUNT: 12.3 % (ref 11.6–14.5)
GLUCOSE SERPL-MCNC: 102 MG/DL (ref 74–99)
HCT VFR BLD AUTO: 35.9 % (ref 35–45)
HGB BLD-MCNC: 12.2 G/DL (ref 12–16)
IMM GRANULOCYTES # BLD AUTO: 0 K/UL (ref 0–0.04)
IMM GRANULOCYTES NFR BLD AUTO: 0 % (ref 0–0.5)
LYMPHOCYTES # BLD: 1 K/UL (ref 0.9–3.6)
LYMPHOCYTES NFR BLD: 18 % (ref 21–52)
MCH RBC QN AUTO: 32.1 PG (ref 24–34)
MCHC RBC AUTO-ENTMCNC: 34 G/DL (ref 31–37)
MCV RBC AUTO: 94.5 FL (ref 78–100)
MONOCYTES # BLD: 0.5 K/UL (ref 0.05–1.2)
MONOCYTES NFR BLD: 9 % (ref 3–10)
NEUTS SEG # BLD: 4.1 K/UL (ref 1.8–8)
NEUTS SEG NFR BLD: 72 % (ref 40–73)
NRBC # BLD: 0 K/UL (ref 0–0.01)
NRBC BLD-RTO: 0 PER 100 WBC
PLATELET # BLD AUTO: 206 K/UL (ref 135–420)
PMV BLD AUTO: 10.8 FL (ref 9.2–11.8)
POTASSIUM SERPL-SCNC: 3.4 MMOL/L (ref 3.5–5.5)
RBC # BLD AUTO: 3.8 M/UL (ref 4.2–5.3)
SODIUM SERPL-SCNC: 141 MMOL/L (ref 136–145)
WBC # BLD AUTO: 5.7 K/UL (ref 4.6–13.2)

## 2024-08-22 PROCEDURE — 6370000000 HC RX 637 (ALT 250 FOR IP): Performed by: NURSE PRACTITIONER

## 2024-08-22 PROCEDURE — 94761 N-INVAS EAR/PLS OXIMETRY MLT: CPT

## 2024-08-22 PROCEDURE — 36415 COLL VENOUS BLD VENIPUNCTURE: CPT

## 2024-08-22 PROCEDURE — 80048 BASIC METABOLIC PNL TOTAL CA: CPT

## 2024-08-22 PROCEDURE — 85025 COMPLETE CBC W/AUTO DIFF WBC: CPT

## 2024-08-22 PROCEDURE — 6360000002 HC RX W HCPCS: Performed by: NURSE PRACTITIONER

## 2024-08-22 PROCEDURE — 6370000000 HC RX 637 (ALT 250 FOR IP): Performed by: INTERNAL MEDICINE

## 2024-08-22 RX ORDER — POTASSIUM CHLORIDE 750 MG/1
40 TABLET, EXTENDED RELEASE ORAL ONCE
Status: COMPLETED | OUTPATIENT
Start: 2024-08-22 | End: 2024-08-22

## 2024-08-22 RX ORDER — SACCHAROMYCES BOULARDII 250 MG
250 CAPSULE ORAL 2 TIMES DAILY
Qty: 14 CAPSULE | Refills: 0 | Status: SHIPPED | OUTPATIENT
Start: 2024-08-22 | End: 2024-08-29

## 2024-08-22 RX ORDER — CLINDAMYCIN HYDROCHLORIDE 300 MG/1
300 CAPSULE ORAL 3 TIMES DAILY
Qty: 21 CAPSULE | Refills: 0 | Status: SHIPPED | OUTPATIENT
Start: 2024-08-22 | End: 2024-08-29

## 2024-08-22 RX ADMIN — VENLAFAXINE 75 MG: 37.5 TABLET ORAL at 08:36

## 2024-08-22 RX ADMIN — CLINDAMYCIN PHOSPHATE 600 MG: 600 INJECTION, SOLUTION INTRAVENOUS at 08:41

## 2024-08-22 RX ADMIN — CLINDAMYCIN PHOSPHATE 600 MG: 600 INJECTION, SOLUTION INTRAVENOUS at 03:45

## 2024-08-22 RX ADMIN — FAMOTIDINE 20 MG: 20 TABLET, FILM COATED ORAL at 08:36

## 2024-08-22 RX ADMIN — POTASSIUM CHLORIDE 40 MEQ: 750 TABLET, EXTENDED RELEASE ORAL at 08:35

## 2024-08-22 RX ADMIN — HYDROCODONE BITARTRATE AND ACETAMINOPHEN 1 TABLET: 5; 325 TABLET ORAL at 03:45

## 2024-08-22 RX ADMIN — DOXYCYCLINE HYCLATE 100 MG: 100 TABLET, COATED ORAL at 08:36

## 2024-08-22 ASSESSMENT — PAIN DESCRIPTION - ORIENTATION: ORIENTATION: LEFT

## 2024-08-22 ASSESSMENT — PAIN SCALES - GENERAL
PAINLEVEL_OUTOF10: 0
PAINLEVEL_OUTOF10: 4
PAINLEVEL_OUTOF10: 8

## 2024-08-22 ASSESSMENT — PAIN DESCRIPTION - ONSET: ONSET: ON-GOING

## 2024-08-22 ASSESSMENT — PAIN DESCRIPTION - FREQUENCY: FREQUENCY: CONTINUOUS

## 2024-08-22 ASSESSMENT — PAIN DESCRIPTION - LOCATION: LOCATION: ARM

## 2024-08-22 ASSESSMENT — PAIN - FUNCTIONAL ASSESSMENT: PAIN_FUNCTIONAL_ASSESSMENT: ACTIVITIES ARE NOT PREVENTED

## 2024-08-22 ASSESSMENT — PAIN DESCRIPTION - PAIN TYPE: TYPE: ACUTE PAIN

## 2024-08-22 ASSESSMENT — PAIN DESCRIPTION - DESCRIPTORS: DESCRIPTORS: THROBBING

## 2024-08-22 NOTE — DISCHARGE SUMMARY
Discharge Summary       PATIENT ID: Sabra Garvey  MRN: [id number]   YOB: 1960    DATE OF ADMISSION: 8/21/2024  1:43 PM    DATE OF DISCHARGE: 08/22/24    PRIMARY CARE PROVIDER: Evi Guzman MD     ATTENDING PHYSICIAN: Basil Chacon MD  DISCHARGING PROVIDER: Basil Chacon MD        CONSULTATIONS: None    PROCEDURES/SURGERIES: * No surgery found *    ADMITTING DIAGNOSES & HOSPITAL COURSE:   Sabra Garvey is a 63 y.o. female with a past medical history significant for HTN, osteoarthritis, breast cancer s/p left lymph node removal with chronic lymphedema who presents to the ED today with complaints of swelling, pain and tenderness, and redness in her entire left arm, new onset this morning when she woke up. States she called her oncologist who told her to come get checked in the ED. States she felt feverish this morning. States she sat outside under the tree last night with a friend. No nausea, or vomit, no body aches, or weakness. No other complaints voiced. ED work up was essentially unremarkable. Her white count is 10.6, lactic is 1.7, procal <0.05. however, due to the extensive erythema, we agreed to inpatient admit for IV antibiotics.      We agreed to inpatient admit criteria for CLINTE cellulitis  Estimated LOS: 2-3 midnight.        DISCHARGE DIAGNOSES / PLAN:      Assessment & Plan:      #1:  Cellulitis of left upper extremity, POA:  -admit to medical unit  -Has severe allergies to PCN, will cont clindamycin 600 mg IV q8hr, add doxycycline 100 mg p.o. bid. Elevate extremity. Pain control with norco prn. Follow blood cultures.  -PVL of RAFAEL is pending.      #2: Hypertension:  -chronic stable issues.  Normotensive.  -cont nebivolol.  Monitor vitals.     #3: Hx Breast Cancer Left lymph node removal, chronic left lymphedema  -chronic stable issues  -cont exemestane     #4: Chronic Right shoulder arthritis  -She is pending a right total shoulder arthroplasty on 8/26/24.      Pt

## 2024-08-22 NOTE — PROGRESS NOTES
Please have patient bring EXEMESTANE (AROMASIN)  from home.  Once it arrives please call pharmacy for a label.   Thanks. Vanessa

## 2024-08-22 NOTE — PROGRESS NOTES
Please have patient bring PRESERVISION AEREDS TABLETS from home.  Once it arrives please call pharmacy for a label.   Thanks. Vanessa

## 2024-08-22 NOTE — PROGRESS NOTES
0700-Beside shift report received from TOMAS Veloz. Assumed care of patient.     0800-Dr. Chacon at bedside.     0836-AM medications given- Education Provided. Pt tolerated well.    0926-Discharge instructions provided. Pt verbalized understanding. IV removed. No complications. Pt tolerated well.

## 2024-08-22 NOTE — PLAN OF CARE
Problem: Discharge Planning  Goal: Discharge to home or other facility with appropriate resources  Outcome: Progressing  Flowsheets (Taken 8/21/2024 2129 by Roselia Dong RN)  Discharge to home or other facility with appropriate resources: Identify barriers to discharge with patient and caregiver     Problem: Pain  Goal: Verbalizes/displays adequate comfort level or baseline comfort level  Outcome: Progressing     Problem: Safety - Adult  Goal: Free from fall injury  Outcome: Progressing

## 2024-08-23 ENCOUNTER — TELEPHONE (OUTPATIENT)
Facility: CLINIC | Age: 64
End: 2024-08-23

## 2024-08-23 NOTE — TELEPHONE ENCOUNTER
Care Transitions Initial Follow Up Call    Outreach made within 2 business days of discharge: Yes    Patient: Sabra Garvey Patient : 1960   MRN: [id number]  Reason for Admission:   Discharge Date: 24       Spoke with: Patient     Discharge department/facility: [facility] Interactive Patient Contact:  Was patient able to fill all prescriptions: Yes  Was patient instructed to bring all medications to the follow-up visit: Yes  Is patient taking all medications as directed in the discharge summary? Yes  Does patient understand their discharge instructions: Yes  Does patient have questions or concerns that need addressed prior to 7-14 day follow up office visit: no      Scheduled appointment with PCP within 7-14 days    Follow Up  Future Appointments   Date Time Provider Department Center   2024  1:00 PM Evi Guzman MD P [facility]   2025 10:00 AM Evi Guzman MD P [facility] DEP       [facility] [place]

## 2024-08-24 LAB
BACTERIA SPEC CULT: NORMAL
BACTERIA SPEC CULT: NORMAL
Lab: NORMAL
Lab: NORMAL

## 2024-08-27 LAB
BACTERIA SPEC CULT: NORMAL
BACTERIA SPEC CULT: NORMAL
Lab: NORMAL
Lab: NORMAL

## 2024-11-18 ENCOUNTER — HOSPITAL ENCOUNTER (OUTPATIENT)
Age: 64
Discharge: HOME | End: 2024-11-20
Payer: COMMERCIAL

## 2024-11-18 DIAGNOSIS — R22.31 LOCALIZED SWELLING, MASS, OR LUMP OF UPPER EXTREMITY, RIGHT: ICD-10-CM

## 2024-11-18 PROCEDURE — 93971 EXTREMITY STUDY: CPT

## 2024-12-09 ENCOUNTER — OFFICE VISIT (OUTPATIENT)
Age: 64
End: 2024-12-09
Payer: COMMERCIAL

## 2024-12-09 VITALS — BODY MASS INDEX: 24.78 KG/M2 | HEIGHT: 66 IN | WEIGHT: 154.2 LBS

## 2024-12-09 DIAGNOSIS — G56.01 RIGHT CARPAL TUNNEL SYNDROME: ICD-10-CM

## 2024-12-09 DIAGNOSIS — M25.531 RIGHT WRIST PAIN: ICD-10-CM

## 2024-12-09 DIAGNOSIS — R22.31 MASS OF RIGHT FOREARM: Primary | ICD-10-CM

## 2024-12-09 PROCEDURE — 99204 OFFICE O/P NEW MOD 45 MIN: CPT | Performed by: ORTHOPAEDIC SURGERY

## 2024-12-09 PROCEDURE — 73110 X-RAY EXAM OF WRIST: CPT | Performed by: ORTHOPAEDIC SURGERY

## 2024-12-09 RX ORDER — CALCIUM CARBONATE 500 MG/1
1 TABLET, CHEWABLE ORAL 2 TIMES DAILY
COMMUNITY

## 2024-12-09 NOTE — PROGRESS NOTES
Sabra Garvey is a 64 y.o. female right handed.  Worker's Compensation and legal considerations: none    Chief Complaint   Patient presents with    Cyst     Right arm     Pain Score:   4    Subjective:     Initial HPI: Patient presents today with complaints of a bump over her dorsal radial forearm that is tender to palpation.  She also reports numbness and tingling in her right hand.    Date of onset: Early 2024  Injury: No  Prior Treatment:  No  Contributory history: None    ROS: Review of Systems - General ROS: negative except HPI    Past Medical History:   Diagnosis Date    Anxiety 2015    Arrhythmia 1992    ABLATION FOR VTACH-at [facility]    Cancer (HCC)     breast cancer-left    Hearing loss 2021    Hypertension     Nausea & vomiting     Osteoarthritis 2023    Vitamin D deficiency 12/14/2020       Past Surgical History:   Procedure Laterality Date    BREAST SURGERY  2015    augmentation    BREAST SURGERY Left 7/2015    left lumpectomy and lymph nodes remove    COLONOSCOPY N/A 4/22/2016    COLONOSCOPY performed by Surekha Carter MD at [facility] ENDOSCOPY    COSMETIC SURGERY  2009    Implants    DILATION AND CURETTAGE OF UTERUS      HYSTERECTOMY (CERVIX STATUS UNKNOWN)      NECK SURGERY  10/2023    VASCULAR SURGERY  2/2015    mediport placement for chemo    WISDOM TOOTH EXTRACTION          Current Outpatient Medications   Medication Sig Dispense Refill    calcium carbonate (TUMS) 500 MG chewable tablet Take 1 tablet by mouth 2 times daily      nebivolol (BYSTOLIC) 5 MG tablet Take 1 tablet by mouth daily 90 tablet 1    Clobetasol Propionate 0.05 % SHAM Apply thin film to dry scalp once daily. Leave in place for 15 minutes then add water, lather and rinse thoroughly. Use for maximum of 2 weeks at a time. 118 mL 0    Multiple Vitamins-Minerals (CENTRUM SILVER ULTRA WOMENS) TABS Take by mouth      Multiple Vitamins-Minerals (PRESERVISION AREDS PO) Take by mouth      famotidine (PEPCID) 20 MG tablet Take 1

## 2025-01-14 RX ORDER — NEBIVOLOL 5 MG/1
5 TABLET ORAL DAILY
Qty: 90 TABLET | Refills: 1 | OUTPATIENT
Start: 2025-01-14

## 2025-01-14 RX ORDER — NEBIVOLOL 5 MG/1
5 TABLET ORAL DAILY
Qty: 90 TABLET | Refills: 1 | Status: SHIPPED | OUTPATIENT
Start: 2025-01-14

## 2025-02-10 ENCOUNTER — TELEPHONE (OUTPATIENT)
Age: 65
End: 2025-02-10

## 2025-02-10 DIAGNOSIS — R22.31 MASS OF RIGHT FOREARM: Primary | ICD-10-CM

## 2025-02-10 NOTE — TELEPHONE ENCOUNTER
Krista from [facility] scheduling is requesting we update patients MRI order to be for right arm instead of left.

## 2025-02-13 ENCOUNTER — PROCEDURE VISIT (OUTPATIENT)
Age: 65
End: 2025-02-13
Payer: COMMERCIAL

## 2025-02-13 VITALS
SYSTOLIC BLOOD PRESSURE: 109 MMHG | HEART RATE: 81 BPM | HEIGHT: 66 IN | RESPIRATION RATE: 16 BRPM | TEMPERATURE: 98.2 F | WEIGHT: 150.2 LBS | DIASTOLIC BLOOD PRESSURE: 76 MMHG | BODY MASS INDEX: 24.14 KG/M2

## 2025-02-13 DIAGNOSIS — R20.0 NUMBNESS AND TINGLING IN RIGHT HAND: Primary | ICD-10-CM

## 2025-02-13 DIAGNOSIS — R20.2 NUMBNESS AND TINGLING IN RIGHT HAND: Primary | ICD-10-CM

## 2025-02-13 PROCEDURE — 95909 NRV CNDJ TST 5-6 STUDIES: CPT | Performed by: PHYSICAL MEDICINE & REHABILITATION

## 2025-02-13 PROCEDURE — 95886 MUSC TEST DONE W/N TEST COMP: CPT | Performed by: PHYSICAL MEDICINE & REHABILITATION

## 2025-02-13 NOTE — PROGRESS NOTES
[facility]  [address] [place] [address], [address]  [place] [address]  Phone: [phone]  Fax: [phone]    Sabra Garvey  : 1960  PCP: Evi Guzman MD  2025    ELECTROMYOGRAPHY AND NERVE CONDUCTION STUDIES    Sabra Garvey was referred by Dr. Jang for electrodiagnostic evaluation of numbness/tingling of right hand.    NCV & EMG Findings:  All nerve conduction studies (as indicated in the following tables) were within normal limits.    All examined muscles (as indicated in the following table) showed no evidence of electrical instability     INTERPRETATION  This was a normal nerve conduction and EMG study showing there to be no signs of neuropathy, myopathy, or radiculopathy in the nerves and muscles tested.         CLINICAL INTERPRETATION  The electrodiagnostic findings do not appear to explain her hand symptoms.       HISTORY OF PRESENT ILLNESS  Sabra Garvey is a 64 y.o. female.    Pt presents today with RUE EMG evaluation for numbness/tingling of right hand.    PAST MEDICAL HISTORY   Past Medical History:   Diagnosis Date    Anxiety 2015    Arrhythmia 1992    ABLATION FOR [facility]ACH-at [facility]    Cancer (HCC)     breast cancer-left    Hearing loss     Hypertension     Nausea & vomiting     Osteoarthritis     Vitamin D deficiency 2020       Past Surgical History:   Procedure Laterality Date    BREAST SURGERY      augmentation    BREAST SURGERY Left 2015    left lumpectomy and lymph nodes remove    COLONOSCOPY N/A 2016    COLONOSCOPY performed by Surekha Carter MD at [facility] ENDOSCOPY    COSMETIC SURGERY      Implants    DILATION AND CURETTAGE OF UTERUS      HYSTERECTOMY (CERVIX STATUS UNKNOWN)      NECK SURGERY  10/2023    VASCULAR SURGERY  2015    mediport placement for chemo    WISDOM TOOTH EXTRACTION         MEDICATIONS    Current Outpatient Medications   Medication Sig Dispense Refill    nebivolol

## 2025-02-14 ENCOUNTER — HOSPITAL ENCOUNTER (OUTPATIENT)
Age: 65
Discharge: HOME | End: 2025-02-17
Payer: COMMERCIAL

## 2025-02-14 DIAGNOSIS — R22.31 MASS OF RIGHT FOREARM: ICD-10-CM

## 2025-02-14 PROCEDURE — 73218 MRI UPPER EXTREMITY W/O DYE: CPT

## 2025-02-17 ENCOUNTER — OFFICE VISIT (OUTPATIENT)
Age: 65
End: 2025-02-17

## 2025-02-17 VITALS — HEIGHT: 66 IN | BODY MASS INDEX: 24.11 KG/M2 | WEIGHT: 150 LBS

## 2025-02-17 DIAGNOSIS — M65.831 EXTENSOR INTERSECTION SYNDROME OF RIGHT WRIST: ICD-10-CM

## 2025-02-17 DIAGNOSIS — G56.01 RIGHT CARPAL TUNNEL SYNDROME: Primary | ICD-10-CM

## 2025-02-17 DIAGNOSIS — R22.31 MASS OF RIGHT FOREARM: ICD-10-CM

## 2025-02-17 RX ORDER — LIDOCAINE HYDROCHLORIDE 10 MG/ML
0.5 INJECTION, SOLUTION INFILTRATION; PERINEURAL ONCE
Status: COMPLETED | OUTPATIENT
Start: 2025-02-17 | End: 2025-02-17

## 2025-02-17 RX ADMIN — LIDOCAINE HYDROCHLORIDE 0.5 ML: 10 INJECTION, SOLUTION INFILTRATION; PERINEURAL at 11:01

## 2025-02-17 NOTE — PROGRESS NOTES
Sabra Garvey is a 64 y.o. female right handed.  Worker's Compensation and legal considerations: none    Chief Complaint   Patient presents with    Follow-up     Right hand     Pain Score:   0 - No pain    Subjective:     2/17/2025 HPI: Patient presents today for follow-up of right upper extremity EMG.  She recently had an MRI as well as past Friday.  She reports continued numbness and tingling in the right hand.    Initial HPI: Patient presents today with complaints of a bump over her dorsal radial forearm that is tender to palpation.  She also reports numbness and tingling in her right hand.    Date of onset: Early 2024  Injury: No  Prior Treatment:  No  Contributory history: None    ROS: Review of Systems - General ROS: negative except HPI    Past Medical History:   Diagnosis Date    Anxiety 2015    Arrhythmia 1992    ABLATION FOR VTACH-at [facility]    Cancer (HCC)     breast cancer-left    Hearing loss 2021    Hypertension     Nausea & vomiting     Osteoarthritis 2023    Vitamin D deficiency 12/14/2020       Past Surgical History:   Procedure Laterality Date    BREAST SURGERY  2015    augmentation    BREAST SURGERY Left 7/2015    left lumpectomy and lymph nodes remove    COLONOSCOPY N/A 4/22/2016    COLONOSCOPY performed by Surekha Carter MD at [facility] ENDOSCOPY    COSMETIC SURGERY  2009    Implants    DILATION AND CURETTAGE OF UTERUS      HYSTERECTOMY (CERVIX STATUS UNKNOWN)      NECK SURGERY  10/2023    VASCULAR SURGERY  2/2015    mediport placement for chemo    WISDOM TOOTH EXTRACTION          Current Outpatient Medications   Medication Sig Dispense Refill    nebivolol (BYSTOLIC) 5 MG tablet Take 1 tablet by mouth daily 90 tablet 1    calcium carbonate (TUMS) 500 MG chewable tablet Take 1 tablet by mouth 2 times daily      Clobetasol Propionate 0.05 % SHAM Apply thin film to dry scalp once daily. Leave in place for 15 minutes then add water, lather and rinse thoroughly. Use for maximum of 2 weeks

## 2025-03-24 ENCOUNTER — OFFICE VISIT (OUTPATIENT)
Age: 65
End: 2025-03-24
Payer: COMMERCIAL

## 2025-03-24 VITALS — BODY MASS INDEX: 23.95 KG/M2 | WEIGHT: 149 LBS | HEIGHT: 66 IN

## 2025-03-24 DIAGNOSIS — G56.01 RIGHT CARPAL TUNNEL SYNDROME: Primary | ICD-10-CM

## 2025-03-24 DIAGNOSIS — M65.831 EXTENSOR INTERSECTION SYNDROME OF RIGHT WRIST: ICD-10-CM

## 2025-03-24 PROCEDURE — 99214 OFFICE O/P EST MOD 30 MIN: CPT | Performed by: ORTHOPAEDIC SURGERY

## 2025-03-24 NOTE — PROGRESS NOTES
Sabra Garvey is a 64 y.o. female right handed.  Worker's Compensation and legal considerations: none    Chief Complaint   Patient presents with    Follow-up     Mri review for right wrist      Pain Score:   3    Subjective:     3/24/2025 HPI: Patient presents today for follow-up of right forearm MRI.  She reports significant improvement or near complete improvement after previous injection and right carpal tunnel.    2/17/2025 HPI: Patient presents today for follow-up of right upper extremity EMG.  She recently had an MRI as well as past Friday.  She reports continued numbness and tingling in the right hand.    Initial HPI: Patient presents today with complaints of a bump over her dorsal radial forearm that is tender to palpation.  She also reports numbness and tingling in her right hand.    Date of onset: Early 2024  Injury: No  Prior Treatment:  No  Contributory history: None    ROS: Review of Systems - General ROS: negative except HPI    Past Medical History:   Diagnosis Date    Anxiety 2015    Arrhythmia 1992    ABLATION FOR VTACH-at [facility]    Cancer (HCC)     breast cancer-left    Hearing loss 2021    Hypertension     Nausea & vomiting     Osteoarthritis 2023    Vitamin D deficiency 12/14/2020       Past Surgical History:   Procedure Laterality Date    BREAST SURGERY  2015    augmentation    BREAST SURGERY Left 7/2015    left lumpectomy and lymph nodes remove    COLONOSCOPY N/A 4/22/2016    COLONOSCOPY performed by Surekha Carter MD at [facility] ENDOSCOPY    COSMETIC SURGERY  2009    Implants    DILATION AND CURETTAGE OF UTERUS      HYSTERECTOMY (CERVIX STATUS UNKNOWN)      NECK SURGERY  10/2023    VASCULAR SURGERY  2/2015    mediport placement for chemo    WISDOM TOOTH EXTRACTION          Current Outpatient Medications   Medication Sig Dispense Refill    nebivolol (BYSTOLIC) 5 MG tablet Take 1 tablet by mouth daily 90 tablet 1    calcium carbonate (TUMS) 500 MG chewable tablet Take 1 tablet by

## 2025-04-22 ENCOUNTER — HOSPITAL ENCOUNTER (OUTPATIENT)
Facility: HOSPITAL | Age: 65
Discharge: HOME | End: 2025-04-25
Attending: INTERNAL MEDICINE
Payer: COMMERCIAL

## 2025-04-22 DIAGNOSIS — Z17.0 MALIGNANT NEOPLASM OF UPPER-OUTER QUADRANT OF LEFT BREAST IN FEMALE, ESTROGEN RECEPTOR POSITIVE (HCC): ICD-10-CM

## 2025-04-22 DIAGNOSIS — C50.412 MALIGNANT NEOPLASM OF UPPER-OUTER QUADRANT OF LEFT BREAST IN FEMALE, ESTROGEN RECEPTOR POSITIVE (HCC): ICD-10-CM

## 2025-04-22 DIAGNOSIS — Z12.39 BREAST CANCER SCREENING, HIGH RISK PATIENT: ICD-10-CM

## 2025-04-22 DIAGNOSIS — Z13.820 ENCOUNTER FOR OSTEOPOROSIS SCREENING IN ASYMPTOMATIC POSTMENOPAUSAL PATIENT: ICD-10-CM

## 2025-04-22 DIAGNOSIS — Z78.0 ENCOUNTER FOR OSTEOPOROSIS SCREENING IN ASYMPTOMATIC POSTMENOPAUSAL PATIENT: ICD-10-CM

## 2025-04-22 DIAGNOSIS — M85.89 OSTEOPENIA OF MULTIPLE SITES: ICD-10-CM

## 2025-04-22 PROCEDURE — 77080 DXA BONE DENSITY AXIAL: CPT

## 2025-07-15 RX ORDER — NEBIVOLOL 5 MG/1
5 TABLET ORAL DAILY
Qty: 90 TABLET | Refills: 1 | Status: SHIPPED | OUTPATIENT
Start: 2025-07-15

## 2025-07-16 ENCOUNTER — HOSPITAL ENCOUNTER (OUTPATIENT)
Age: 65
Discharge: HOME | End: 2025-07-19
Payer: COMMERCIAL

## 2025-07-16 ENCOUNTER — TRANSCRIBE ORDERS (OUTPATIENT)
Age: 65
End: 2025-07-16

## 2025-07-16 ENCOUNTER — HOSPITAL ENCOUNTER (OUTPATIENT)
Age: 65
Setting detail: SPECIMEN
Discharge: HOME | End: 2025-07-19
Payer: COMMERCIAL

## 2025-07-16 DIAGNOSIS — M16.11 PRIMARY OSTEOARTHRITIS OF RIGHT HIP: ICD-10-CM

## 2025-07-16 DIAGNOSIS — M16.11 PRIMARY OSTEOARTHRITIS OF RIGHT HIP: Primary | ICD-10-CM

## 2025-07-16 LAB
ANION GAP SERPL CALC-SCNC: 10 MMOL/L
APTT PPP: 29.7 SEC (ref 21.7–34.2)
BUN SERPL-MCNC: 14 MG/DL (ref 6–23)
BUN/CREAT SERPL: 20
CA-I BLD-MCNC: 9.9 MG/DL (ref 8.5–10.1)
CHLORIDE SERPL-SCNC: 102 MMOL/L (ref 98–107)
CO2 SERPL-SCNC: 28 MMOL/L (ref 21–32)
CREAT SERPL-MCNC: 0.72 MG/DL (ref 0.6–1.3)
EKG ATRIAL RATE: 75 BPM
EKG DIAGNOSIS: NORMAL
EKG P AXIS: 39 DEGREES
EKG P-R INTERVAL: 180 MS
EKG Q-T INTERVAL: 360 MS
EKG QRS DURATION: 86 MS
EKG QTC CALCULATION (BAZETT): 402 MS
EKG R AXIS: 46 DEGREES
EKG T AXIS: 66 DEGREES
EKG VENTRICULAR RATE: 75 BPM
ERYTHROCYTE [DISTWIDTH] IN BLOOD BY AUTOMATED COUNT: 11.9 % (ref 11.6–14.5)
GLUCOSE SERPL-MCNC: 96 MG/DL (ref 74–108)
HCT VFR BLD AUTO: 40.9 % (ref 35–45)
HGB BLD-MCNC: 13.5 G/DL (ref 12–16)
INR PPP: 0.9 (ref 0.9–1.2)
MCH RBC QN AUTO: 31.8 PG (ref 24–34)
MCHC RBC AUTO-ENTMCNC: 33 G/DL (ref 31–37)
MCV RBC AUTO: 96.5 FL (ref 78–100)
NRBC # BLD: 0 K/UL (ref 0–0.01)
NRBC BLD-RTO: 0 PER 100 WBC
PLATELET # BLD AUTO: 257 K/UL (ref 135–420)
PMV BLD AUTO: 10.5 FL (ref 9.2–11.8)
POTASSIUM SERPL-SCNC: 4.2 MMOL/L (ref 3.5–5.5)
PROTHROMBIN TIME: 12.9 SEC (ref 12–15.1)
RBC # BLD AUTO: 4.24 M/UL (ref 4.2–5.3)
SODIUM SERPL-SCNC: 140 MMOL/L (ref 136–145)
THERAPEUTIC RANGE: NORMAL SEC (ref 73–110)
WBC # BLD AUTO: 4.9 K/UL (ref 4.6–13.2)

## 2025-07-16 PROCEDURE — 36415 COLL VENOUS BLD VENIPUNCTURE: CPT

## 2025-07-16 PROCEDURE — 93005 ELECTROCARDIOGRAM TRACING: CPT

## 2025-07-16 PROCEDURE — 85610 PROTHROMBIN TIME: CPT

## 2025-07-16 PROCEDURE — 80048 BASIC METABOLIC PNL TOTAL CA: CPT

## 2025-07-16 PROCEDURE — 71046 X-RAY EXAM CHEST 2 VIEWS: CPT

## 2025-07-16 PROCEDURE — 85730 THROMBOPLASTIN TIME PARTIAL: CPT

## 2025-07-16 PROCEDURE — 85027 COMPLETE CBC AUTOMATED: CPT

## 2025-07-24 ENCOUNTER — OFFICE VISIT (OUTPATIENT)
Facility: CLINIC | Age: 65
End: 2025-07-24
Payer: COMMERCIAL

## 2025-07-24 VITALS
RESPIRATION RATE: 17 BRPM | HEART RATE: 79 BPM | SYSTOLIC BLOOD PRESSURE: 124 MMHG | DIASTOLIC BLOOD PRESSURE: 86 MMHG | OXYGEN SATURATION: 99 % | BODY MASS INDEX: 24.2 KG/M2 | TEMPERATURE: 97.8 F | HEIGHT: 66 IN | WEIGHT: 150.6 LBS

## 2025-07-24 DIAGNOSIS — I89.0 LYMPHEDEMA: ICD-10-CM

## 2025-07-24 DIAGNOSIS — L40.9 PSORIASIS: ICD-10-CM

## 2025-07-24 DIAGNOSIS — Z00.00 ENCOUNTER FOR PHYSICAL EXAMINATION: ICD-10-CM

## 2025-07-24 DIAGNOSIS — M85.89 OSTEOPENIA OF MULTIPLE SITES: ICD-10-CM

## 2025-07-24 DIAGNOSIS — I10 PRIMARY HYPERTENSION: ICD-10-CM

## 2025-07-24 DIAGNOSIS — Z85.3 HISTORY OF BREAST CANCER: ICD-10-CM

## 2025-07-24 DIAGNOSIS — Z01.810 PREOP CARDIOVASCULAR EXAM: ICD-10-CM

## 2025-07-24 DIAGNOSIS — M25.551 RIGHT HIP PAIN: Primary | ICD-10-CM

## 2025-07-24 PROBLEM — G95.89 MYELOMALACIA OF CERVICAL CORD (HCC): Status: RESOLVED | Noted: 2023-08-24 | Resolved: 2025-07-24

## 2025-07-24 PROBLEM — L03.114 CELLULITIS OF LEFT ARM: Status: RESOLVED | Noted: 2024-08-21 | Resolved: 2025-07-24

## 2025-07-24 PROCEDURE — 99396 PREV VISIT EST AGE 40-64: CPT | Performed by: STUDENT IN AN ORGANIZED HEALTH CARE EDUCATION/TRAINING PROGRAM

## 2025-07-24 PROCEDURE — 3074F SYST BP LT 130 MM HG: CPT | Performed by: STUDENT IN AN ORGANIZED HEALTH CARE EDUCATION/TRAINING PROGRAM

## 2025-07-24 PROCEDURE — 99214 OFFICE O/P EST MOD 30 MIN: CPT | Performed by: STUDENT IN AN ORGANIZED HEALTH CARE EDUCATION/TRAINING PROGRAM

## 2025-07-24 PROCEDURE — 3079F DIAST BP 80-89 MM HG: CPT | Performed by: STUDENT IN AN ORGANIZED HEALTH CARE EDUCATION/TRAINING PROGRAM

## 2025-07-24 ASSESSMENT — PATIENT HEALTH QUESTIONNAIRE - PHQ9
1. LITTLE INTEREST OR PLEASURE IN DOING THINGS: NOT AT ALL
SUM OF ALL RESPONSES TO PHQ QUESTIONS 1-9: 0
2. FEELING DOWN, DEPRESSED OR HOPELESS: NOT AT ALL
SUM OF ALL RESPONSES TO PHQ QUESTIONS 1-9: 0

## 2025-07-24 ASSESSMENT — ENCOUNTER SYMPTOMS
RESPIRATORY NEGATIVE: 1
GASTROINTESTINAL NEGATIVE: 1

## 2025-07-24 NOTE — PROGRESS NOTES
PREOPERATIVE EVALUATION    Sabra Garvey (: 1960) is a 64 y.o. female here for evaluation of the following chief concerns(s):  Pre-op Exam (Pre-op exam for right total hip replacement on  with Dr. Paz)       ASSESSMENT/PLAN:  1. Right hip pain  2. Preop cardiovascular exam  3. Encounter for physical examination    No orders of the defined types were placed in this encounter.      ACC/AHA classification of surgical procedure risk:   - Moderate risk     Reviewed/ordered lab tests (H/H, Creatinine, EKG, CXR). Independent interpretation of EKG: NSR, no ischemic changes.     Patient is cleared for surgery, pending anesthesia clearance on the day of surgery.    Continue meds per discussion with surgenon/anesthesia.    SUBJECTIVE/OBJECTIVE:  Patient presents for preoperative evaluation   Surgery is R hip replacement  Date 2025  Surgeon/location Dr. Paz    Hx CAD: No   Hx CHF: No   Hx valvular disease: No  Hx Pulmonary disease: No  Hx T2DM: No  Hx HTN: Yes  DAWN: No  Tobacco use: No   Heavy alcohol use: No    Pervious history of anesthesia and tolerated well. YES    Functional status, patient can complete >4 METS (climbing flight of stairs, yard work, etc) : YES     RCI  High risk surgery (1)  CAD (1)   CHF (1)  Hx CVA (1)  Insulin treatment for diabetes (1)   Preoperative serum creatinine >2 (1)     Total   0= class 1 risk (0.4%)   Risk of major cardiac event    Denies fever/chills, chest pain, SOB, abdominal pain, leg swelling    Past Medical History:   Diagnosis Date    Anxiety     Arrhythmia 1992    ABLATION FOR VTACH-at [facility]    Cancer (HCC)     breast cancer-left    Hearing loss     Hypertension     Nausea & vomiting     Osteoarthritis     Vitamin D deficiency 2020     Past Surgical History:   Procedure Laterality Date    BREAST SURGERY  2015    augmentation    BREAST SURGERY Left 2015    left lumpectomy and lymph nodes remove    COLONOSCOPY N/A 2016    COLONOSCOPY 
Sabra Garvey presents today for   Chief Complaint   Patient presents with    Pre-op Exam     Pre-op exam for right total hip replacement on 8/29 with Dr. Paz       Is someone accompanying this pt? no    Is the patient using any DME equipment during OV? no    Health Maintenance Due   Topic Date Due    HIV screen  Never done    Pneumococcal 50+ years Vaccine (1 of 1 - PCV) Never done    COVID-19 Vaccine (4 - 2024-25 season) 09/01/2024    Depression Screen  07/23/2025         \"Have you been to the ER, urgent care clinic since your last visit?  Hospitalized since your last visit?\"    Yes- [facility] ER     “Have you seen or consulted any other health care providers outside our system since your last visit?”    Yes- raisa           
No gallop.   Pulmonary:      Effort: Pulmonary effort is normal.      Breath sounds: Normal breath sounds.   Abdominal:      General: Abdomen is flat.      Palpations: Abdomen is soft.   Musculoskeletal:         General: No swelling, tenderness or deformity.      Cervical back: Neck supple.   Lymphadenopathy:      Cervical: No cervical adenopathy.   Skin:     General: Skin is warm and dry.   Neurological:      General: No focal deficit present.      Mental Status: She is alert. Mental status is at baseline.   Psychiatric:         Mood and Affect: Mood normal.         Behavior: Behavior normal.         Thought Content: Thought content normal.         Judgment: Judgment normal.             Personalized Preventive Plan  Current Health Maintenance Status  Immunization History   Administered Date(s) Administered    COVID-19, MODERNA BLUE border, Primary or Immunocompromised, (age 12y+), IM, 100 mcg/0.5mL 02/02/2021, 03/02/2021, 11/03/2021    Influenza Virus Vaccine 10/30/2021, 10/05/2022    TDaP, ADACEL (age 10y-64y), BOOSTRIX (age 10y+), IM, 0.5mL 01/20/2017    Zoster Live (Zostavax) 10/14/2014    Zoster Recombinant (Shingrix) 05/07/2020, 07/07/2020        Health Maintenance Due   Topic Date Due    HIV screen  Never done    Pneumococcal 50+ years Vaccine (1 of 1 - PCV) Never done    COVID-19 Vaccine (4 - 2024-25 season) 09/01/2024    Depression Screen  07/23/2025      Recommendations for Preventive Services Due: see orders and patient instructions/AVS.

## 2025-08-14 ENCOUNTER — HOSPITAL ENCOUNTER (OUTPATIENT)
Facility: HOSPITAL | Age: 65
Setting detail: SPECIMEN
Discharge: HOME | End: 2025-08-17
Payer: COMMERCIAL

## 2025-08-14 ENCOUNTER — OFFICE VISIT (OUTPATIENT)
Age: 65
End: 2025-08-14

## 2025-08-14 VITALS
DIASTOLIC BLOOD PRESSURE: 85 MMHG | SYSTOLIC BLOOD PRESSURE: 115 MMHG | WEIGHT: 151.24 LBS | OXYGEN SATURATION: 97 % | BODY MASS INDEX: 24.31 KG/M2 | RESPIRATION RATE: 20 BRPM | HEIGHT: 66 IN | HEART RATE: 76 BPM | TEMPERATURE: 98.1 F

## 2025-08-14 DIAGNOSIS — N30.00 ACUTE CYSTITIS WITHOUT HEMATURIA: Primary | ICD-10-CM

## 2025-08-14 DIAGNOSIS — R35.0 URINARY FREQUENCY: ICD-10-CM

## 2025-08-14 DIAGNOSIS — R30.0 DYSURIA: ICD-10-CM

## 2025-08-14 PROCEDURE — 87086 URINE CULTURE/COLONY COUNT: CPT

## 2025-08-14 RX ORDER — SULFAMETHOXAZOLE AND TRIMETHOPRIM 800; 160 MG/1; MG/1
1 TABLET ORAL 2 TIMES DAILY
Qty: 10 TABLET | Refills: 0 | Status: SHIPPED | OUTPATIENT
Start: 2025-08-14 | End: 2025-08-19

## 2025-08-15 LAB
BACTERIA SPEC CULT: NORMAL
SERVICE CMNT-IMP: NORMAL